# Patient Record
Sex: FEMALE | URBAN - METROPOLITAN AREA
[De-identification: names, ages, dates, MRNs, and addresses within clinical notes are randomized per-mention and may not be internally consistent; named-entity substitution may affect disease eponyms.]

---

## 2017-08-14 PROBLEM — I77.810 DILATED AORTIC ROOT (HCC): Status: ACTIVE | Noted: 2017-08-14

## 2018-03-21 ENCOUNTER — APPOINTMENT (RX ONLY)
Dept: URBAN - METROPOLITAN AREA CLINIC 349 | Facility: CLINIC | Age: 58
Setting detail: DERMATOLOGY
End: 2018-03-21

## 2018-03-21 DIAGNOSIS — L82.0 INFLAMED SEBORRHEIC KERATOSIS: ICD-10-CM

## 2018-03-21 DIAGNOSIS — D22 MELANOCYTIC NEVI: ICD-10-CM

## 2018-03-21 PROBLEM — K21.9 GASTRO-ESOPHAGEAL REFLUX DISEASE WITHOUT ESOPHAGITIS: Status: ACTIVE | Noted: 2018-03-21

## 2018-03-21 PROBLEM — J44.9 CHRONIC OBSTRUCTIVE PULMONARY DISEASE, UNSPECIFIED: Status: ACTIVE | Noted: 2018-03-21

## 2018-03-21 PROBLEM — E13.9 OTHER SPECIFIED DIABETES MELLITUS WITHOUT COMPLICATIONS: Status: ACTIVE | Noted: 2018-03-21

## 2018-03-21 PROBLEM — F41.9 ANXIETY DISORDER, UNSPECIFIED: Status: ACTIVE | Noted: 2018-03-21

## 2018-03-21 PROBLEM — D22.5 MELANOCYTIC NEVI OF TRUNK: Status: ACTIVE | Noted: 2018-03-21

## 2018-03-21 PROBLEM — L85.3 XEROSIS CUTIS: Status: ACTIVE | Noted: 2018-03-21

## 2018-03-21 PROBLEM — F32.9 MAJOR DEPRESSIVE DISORDER, SINGLE EPISODE, UNSPECIFIED: Status: ACTIVE | Noted: 2018-03-21

## 2018-03-21 PROCEDURE — 99202 OFFICE O/P NEW SF 15 MIN: CPT | Mod: 25

## 2018-03-21 PROCEDURE — 17110 DESTRUCTION B9 LES UP TO 14: CPT

## 2018-03-21 PROCEDURE — ? LIQUID NITROGEN

## 2018-03-21 PROCEDURE — ? COUNSELING

## 2018-03-21 PROCEDURE — ? PHOTO-DOCUMENTATION

## 2018-03-21 ASSESSMENT — LOCATION SIMPLE DESCRIPTION DERM
LOCATION SIMPLE: CHEST
LOCATION SIMPLE: RIGHT PRETIBIAL REGION

## 2018-03-21 ASSESSMENT — LOCATION ZONE DERM
LOCATION ZONE: LEG
LOCATION ZONE: TRUNK

## 2018-03-21 ASSESSMENT — LOCATION DETAILED DESCRIPTION DERM
LOCATION DETAILED: RIGHT DISTAL PRETIBIAL REGION
LOCATION DETAILED: RIGHT LATERAL SUPERIOR CHEST

## 2018-03-21 NOTE — PROCEDURE: LIQUID NITROGEN
Medical Necessity Information: It is in your best interest to select a reason for this procedure from the list below. All of these items fulfill various CMS LCD requirements except the new and changing color options.
Consent: The patient's consent was obtained including but not limited to risks of crusting, scabbing, blistering, scarring, darker or lighter pigmentary change, recurrence, incomplete removal and infection.
Include Z78.9 (Other Specified Conditions Influencing Health Status) As An Associated Diagnosis?: Yes
Detail Level: Detailed
Number Of Freeze-Thaw Cycles: 2 freeze-thaw cycles
Medical Necessity Clause: This procedure was medically necessary because the lesions that were treated were:
Post-Care Instructions: I reviewed with the patient in detail post-care instructions. Patient is to wear sunprotection, and avoid picking at any of the treated lesions. Pt may apply Vaseline to crusted or scabbing areas.
Add 52 Modifier (Optional): no

## 2018-03-21 NOTE — PROCEDURE: PHOTO-DOCUMENTATION
Detail Level: Zone
Photo Preface (Leave Blank If You Do Not Want): Photographs were obtained today of the right lower leg

## 2018-05-01 PROBLEM — Z79.01 LONG TERM (CURRENT) USE OF ANTICOAGULANTS: Status: ACTIVE | Noted: 2018-05-01

## 2019-02-22 PROBLEM — I71.20 THORACIC AORTIC ANEURYSM WITHOUT RUPTURE: Status: ACTIVE | Noted: 2019-02-22

## 2019-08-23 PROBLEM — E11.9 CONTROLLED TYPE 2 DIABETES MELLITUS WITHOUT COMPLICATION, WITHOUT LONG-TERM CURRENT USE OF INSULIN (HCC): Status: ACTIVE | Noted: 2019-08-23

## 2021-04-19 ENCOUNTER — HOSPITAL ENCOUNTER (OUTPATIENT)
Dept: CT IMAGING | Age: 61
Discharge: HOME | End: 2021-04-19
Attending: INTERNAL MEDICINE
Payer: MEDICAID

## 2021-04-19 DIAGNOSIS — I71.20 THORACIC AORTIC ANEURYSM WITHOUT RUPTURE: ICD-10-CM

## 2021-04-19 LAB — CREAT BLD-MCNC: 0.9 MG/DL (ref 0.8–1.5)

## 2021-04-19 PROCEDURE — 71275 CT ANGIOGRAPHY CHEST: CPT

## 2021-04-19 PROCEDURE — 74011000636 HC RX REV CODE- 636: Performed by: INTERNAL MEDICINE

## 2021-04-19 PROCEDURE — 74011000258 HC RX REV CODE- 258: Performed by: INTERNAL MEDICINE

## 2021-04-19 PROCEDURE — 82565 ASSAY OF CREATININE: CPT

## 2021-04-19 RX ORDER — SODIUM CHLORIDE 0.9 % (FLUSH) 0.9 %
10 SYRINGE (ML) INJECTION
Status: COMPLETED | OUTPATIENT
Start: 2021-04-19 | End: 2021-04-19

## 2021-04-19 RX ADMIN — Medication 10 ML: at 10:07

## 2021-04-19 RX ADMIN — IOPAMIDOL 100 ML: 755 INJECTION, SOLUTION INTRAVENOUS at 10:07

## 2021-04-19 RX ADMIN — SODIUM CHLORIDE 100 ML: 900 INJECTION, SOLUTION INTRAVENOUS at 10:07

## 2022-03-18 PROBLEM — E11.9 CONTROLLED TYPE 2 DIABETES MELLITUS WITHOUT COMPLICATION, WITHOUT LONG-TERM CURRENT USE OF INSULIN (HCC): Status: ACTIVE | Noted: 2019-08-23

## 2022-03-18 PROBLEM — I77.810 DILATED AORTIC ROOT (HCC): Status: ACTIVE | Noted: 2017-08-14

## 2022-03-19 PROBLEM — Z79.01 LONG TERM (CURRENT) USE OF ANTICOAGULANTS: Status: ACTIVE | Noted: 2018-05-01

## 2022-03-20 PROBLEM — I71.20 THORACIC AORTIC ANEURYSM WITHOUT RUPTURE: Status: ACTIVE | Noted: 2019-02-22

## 2022-06-01 ENCOUNTER — ANTI-COAG VISIT (OUTPATIENT)
Dept: CARDIOLOGY CLINIC | Age: 62
End: 2022-06-01
Payer: MEDICAID

## 2022-06-01 DIAGNOSIS — Z95.2 AORTIC VALVE REPLACED: ICD-10-CM

## 2022-06-01 DIAGNOSIS — Z79.01 LONG TERM (CURRENT) USE OF ANTICOAGULANTS: Primary | ICD-10-CM

## 2022-06-01 LAB
POC INR: 1.6
PROTHROMBIN TIME, POC: NORMAL
VALID INTERNAL CONTROL, POC: YES

## 2022-06-01 PROCEDURE — 85610 PROTHROMBIN TIME: CPT | Performed by: INTERNAL MEDICINE

## 2022-06-01 PROCEDURE — 93793 ANTICOAG MGMT PT WARFARIN: CPT | Performed by: INTERNAL MEDICINE

## 2022-06-01 NOTE — PROGRESS NOTES
Anticoagulation Summary  As of 2022    INR goal:  1.5-2.5   TTR:  --   INR used for dosin.6 (2022)   Warfarin maintenance plan:  2 mg (4 mg x 0.5) every Mon; 4 mg (4 mg x 1) all other days   Weekly warfarin total:  26 mg   Plan last modified:  Myra Tuttle MA (2022)   Next INR check:     Target end date:   Indefinite    Indications    Long term (current) use of anticoagulants [Z79.01]  Aortic valve replaced [Z95.2]             Anticoagulation Episode Summary     INR check location:  Anticoagulation Clinic    Preferred lab:      Send INR reminders to:  [facility]ARDI[facility]    Comments:  **PT RANGE IS 1.8-2.5**

## 2022-06-01 NOTE — PATIENT INSTRUCTIONS
Reminder: Please contact the Coumadin Clinic at [phone]  when you have medication changes. Examples, new medications, antibiotics, discontinued medications, new supplements, missed doses of warfarin or if you took extra doses of warfarin. This also includes OTC medications. Notifying us helps reduce the possibility of high and low INR's. In addition, if warfarin needs to be held for any procedures, please have surgeon or physician's office contact us before holding anticoagulant. Thanks, [address] Card[facility] Coumadin [facility].

## 2022-06-15 ENCOUNTER — ANTI-COAG VISIT (OUTPATIENT)
Dept: CARDIOLOGY CLINIC | Age: 62
End: 2022-06-15
Payer: MEDICAID

## 2022-06-15 DIAGNOSIS — Z79.01 LONG TERM (CURRENT) USE OF ANTICOAGULANTS: Primary | ICD-10-CM

## 2022-06-15 DIAGNOSIS — Z95.2 AORTIC VALVE REPLACED: ICD-10-CM

## 2022-06-15 LAB
POC INR: 1.8
PROTHROMBIN TIME, POC: NORMAL
VALID INTERNAL CONTROL, POC: YES

## 2022-06-15 PROCEDURE — 85610 PROTHROMBIN TIME: CPT | Performed by: INTERNAL MEDICINE

## 2022-06-15 PROCEDURE — 93793 ANTICOAG MGMT PT WARFARIN: CPT | Performed by: INTERNAL MEDICINE

## 2022-06-15 NOTE — PROGRESS NOTES
Anticoagulation Summary  As of 6/15/2022    INR goal:  1.5-2.5   TTR:  100.0 % (4 d)   INR used for dosin.8 (6/15/2022)   Warfarin maintenance plan:  2 mg (4 mg x 0.5) every Mon; 4 mg (4 mg x 1) all other days   Weekly warfarin total:  26 mg   Plan last modified:  Ariadna Garner MA (2022)   Next INR check:  2022   Target end date:   Indefinite    Indications    Long term (current) use of anticoagulants [Z79.01]  Aortic valve replaced [Z95.2]             Anticoagulation Episode Summary     INR check location:  Anticoagulation Clinic    Preferred lab:      Send INR reminders to:  [facility]    Comments:  **PT RANGE IS 1.8-2.5**

## 2022-07-13 ENCOUNTER — ANTI-COAG VISIT (OUTPATIENT)
Dept: CARDIOLOGY CLINIC | Age: 62
End: 2022-07-13
Payer: MEDICAID

## 2022-07-13 DIAGNOSIS — Z95.2 AORTIC VALVE REPLACED: ICD-10-CM

## 2022-07-13 DIAGNOSIS — Z79.01 LONG TERM (CURRENT) USE OF ANTICOAGULANTS: Primary | ICD-10-CM

## 2022-07-13 LAB
POC INR: 1.4
PROTHROMBIN TIME, POC: ABNORMAL
VALID INTERNAL CONTROL, POC: YES

## 2022-07-13 PROCEDURE — 85610 PROTHROMBIN TIME: CPT | Performed by: INTERNAL MEDICINE

## 2022-07-13 PROCEDURE — 93793 ANTICOAG MGMT PT WARFARIN: CPT | Performed by: INTERNAL MEDICINE

## 2022-07-13 NOTE — PROGRESS NOTES
Anticoagulation Summary  As of 2022    INR goal:  1.5-2.5   TTR:  78.5 % (1.1 mo)   INR used for dosin.4 (2022)   Warfarin maintenance plan:  2 mg (4 mg x 0.5) every Mon; 4 mg (4 mg x 1) all other days   Weekly warfarin total:  26 mg   Plan last modified:  Trina Norris MA (2022)   Next INR check:  2022   Target end date: Indefinite    Indications    Long term (current) use of anticoagulants [Z79.01]  Aortic valve replaced [Z95.2]             Anticoagulation Episode Summary     INR check location:  Anticoagulation Clinic    Preferred lab:      Send INR reminders to:  [facility]ARD[facility]    Comments:  **PT RANGE IS 1.8-2.5**        Patient unsure why INR is subtherapeutic. States that she has not missed any doses, nor has she eaten any Vitamin K. Instructed her to take a booster dose of 6mg tonight and take 4mg on Monday of next week. Recheck INR in ~2 weeks. Patient verbalized understanding & thanked.  //pg

## 2022-07-29 ENCOUNTER — ANTI-COAG VISIT (OUTPATIENT)
Dept: CARDIOLOGY CLINIC | Age: 62
End: 2022-07-29
Payer: MEDICAID

## 2022-07-29 DIAGNOSIS — Z95.2 AORTIC VALVE REPLACED: ICD-10-CM

## 2022-07-29 DIAGNOSIS — Z79.01 LONG TERM (CURRENT) USE OF ANTICOAGULANTS: Primary | ICD-10-CM

## 2022-07-29 LAB
POC INR: 2
PROTHROMBIN TIME, POC: YES

## 2022-07-29 PROCEDURE — 85610 PROTHROMBIN TIME: CPT | Performed by: INTERNAL MEDICINE

## 2022-07-29 PROCEDURE — 93793 ANTICOAG MGMT PT WARFARIN: CPT | Performed by: INTERNAL MEDICINE

## 2022-07-29 NOTE — PATIENT INSTRUCTIONS
Reminder: Please contact the Coumadin Clinic at [phone]  when you have medication changes. Examples, new medications, antibiotics, discontinued medications, new supplements, missed doses of warfarin or if you took extra doses of warfarin. This also includes OTC medications. Notifying us helps reduce the possibility of high and low INR's. In addition, if warfarin needs to be held for any procedures, please have surgeon or physician's office contact us before holding anticoagulant. Thanks, [address] Cardiology Coumadin Clinic.

## 2022-07-29 NOTE — PROGRESS NOTES
Anticoagulation Summary  As of 7/29/2022      INR goal:  1.5-2.5   TTR:  80.1 % (1.6 mo)   INR used for dosing:     Warfarin maintenance plan:  2 mg (4 mg x 0.5) every Mon; 4 mg (4 mg x 1) all other days   Weekly warfarin total:  26 mg   Plan last modified:  Rocio Hua MA (6/1/2022)   Next INR check:  8/12/2022   Target end date:   Indefinite    Indications    Long term (current) use of anticoagulants [Z79.01]  Aortic valve replaced [Z95.2]                 Anticoagulation Episode Summary       INR check location:  Anticoagulation Clinic    Preferred lab:      Send INR reminders to:  [facility]ARD[facility]    Comments:  **PT RANGE IS 1.8-2.5**

## 2022-08-12 ENCOUNTER — ANTI-COAG VISIT (OUTPATIENT)
Dept: CARDIOLOGY CLINIC | Age: 62
End: 2022-08-12
Payer: MEDICAID

## 2022-08-12 DIAGNOSIS — Z79.01 LONG TERM (CURRENT) USE OF ANTICOAGULANTS: Primary | ICD-10-CM

## 2022-08-12 DIAGNOSIS — Z95.2 AORTIC VALVE REPLACED: ICD-10-CM

## 2022-08-12 LAB
POC INR: 2.2
PROTHROMBIN TIME, POC: YES

## 2022-08-12 PROCEDURE — 93793 ANTICOAG MGMT PT WARFARIN: CPT | Performed by: INTERNAL MEDICINE

## 2022-08-12 PROCEDURE — 85610 PROTHROMBIN TIME: CPT | Performed by: INTERNAL MEDICINE

## 2022-08-12 NOTE — PATIENT INSTRUCTIONS
Reminder: Please contact the Coumadin Clinic at [phone]  when you have medication changes. Examples, new medications, antibiotics, discontinued medications, new supplements, missed doses of warfarin or if you took extra doses of warfarin. This also includes OTC medications. Notifying us helps reduce the possibility of high and low INR's. In addition, if warfarin needs to be held for any procedures, please have surgeon or physician's office contact us before holding anticoagulant. Thanks, [address] Cardiology Coumadin Clinic.

## 2022-08-12 NOTE — PROGRESS NOTES
Anticoagulation Summary  As of 2022      INR goal:  1.5-2.5   TTR:  84.5 % (2.1 mo)   INR used for dosin.2 (2022)   Warfarin maintenance plan:  2 mg (4 mg x 0.5) every Mon; 4 mg (4 mg x 1) all other days   Weekly warfarin total:  26 mg   Plan last modified:  Sy Keating MA (2022)   Next INR check:  2022   Target end date:   Indefinite    Indications    Long term (current) use of anticoagulants [Z79.01]  Aortic valve replaced [Z95.2]                 Anticoagulation Episode Summary       INR check location:  Anticoagulation Clinic    Preferred lab:      Send INR reminders to:  [facility]    Comments:  **PT RANGE IS 1.8-2.5**

## 2022-09-09 ENCOUNTER — ANTI-COAG VISIT (OUTPATIENT)
Dept: CARDIOLOGY CLINIC | Age: 62
End: 2022-09-09
Payer: MEDICAID

## 2022-09-09 DIAGNOSIS — Z95.2 AORTIC VALVE REPLACED: ICD-10-CM

## 2022-09-09 DIAGNOSIS — Z79.01 LONG TERM (CURRENT) USE OF ANTICOAGULANTS: Primary | ICD-10-CM

## 2022-09-09 LAB
POC INR: 1.8
PROTHROMBIN TIME, POC: YES

## 2022-09-09 PROCEDURE — 93793 ANTICOAG MGMT PT WARFARIN: CPT | Performed by: INTERNAL MEDICINE

## 2022-09-09 PROCEDURE — 85610 PROTHROMBIN TIME: CPT | Performed by: INTERNAL MEDICINE

## 2022-09-09 NOTE — PROGRESS NOTES
Anticoagulation Summary  As of 2022      INR goal:  1.5-2.5   TTR:  89.3 % (3 mo)   INR used for dosin.8 (2022)   Warfarin maintenance plan:  2 mg (4 mg x 0.5) every Mon; 4 mg (4 mg x 1) all other days   Weekly warfarin total:  26 mg   Plan last modified:  Bolivar Waters MA (2022)   Next INR check:  10/7/2022   Target end date:   Indefinite    Indications    Long term (current) use of anticoagulants [Z79.01]  Aortic valve replaced [Z95.2]                 Anticoagulation Episode Summary       INR check location:  Anticoagulation Clinic    Preferred lab:      Send INR reminders to:  [facility]    Comments:  **PT RANGE IS 1.8-2.5**

## 2022-09-09 NOTE — PATIENT INSTRUCTIONS
Reminder: Please contact the Coumadin Clinic at [phone]  when you have medication changes. Examples, new medications, antibiotics, discontinued medications, new supplements, missed doses of warfarin or if you took extra doses of warfarin. This also includes OTC medications. Notifying us helps reduce the possibility of high and low INR's. In addition, if warfarin needs to be held for any procedures, please have surgeon or physician's office contact us before holding anticoagulant. Thanks, [facility]in [facility].

## 2022-10-07 ENCOUNTER — ANTI-COAG VISIT (OUTPATIENT)
Dept: CARDIOLOGY CLINIC | Age: 62
End: 2022-10-07
Payer: MEDICAID

## 2022-10-07 DIAGNOSIS — Z95.2 AORTIC VALVE REPLACED: ICD-10-CM

## 2022-10-07 DIAGNOSIS — Z79.01 LONG TERM (CURRENT) USE OF ANTICOAGULANTS: Primary | ICD-10-CM

## 2022-10-07 LAB
POC INR: 1.4
PROTHROMBIN TIME, POC: YES

## 2022-10-07 PROCEDURE — 93793 ANTICOAG MGMT PT WARFARIN: CPT | Performed by: INTERNAL MEDICINE

## 2022-10-07 PROCEDURE — 85610 PROTHROMBIN TIME: CPT | Performed by: INTERNAL MEDICINE

## 2022-10-07 NOTE — PROGRESS NOTES
Anticoagulation Summary  As of 10/7/2022      INR goal:  1.5-2.5   TTR:  86.0 % (3.9 mo)   INR used for dosing:     Warfarin maintenance plan:  4 mg (4 mg x 1) every day   Weekly warfarin total:  28 mg   Plan last modified:  Rogelio Parks MA (10/7/2022)   Next INR check:  10/21/2022   Target end date:   Indefinite    Indications    Long term (current) use of anticoagulants [Z79.01]  Aortic valve replaced [Z95.2]                 Anticoagulation Episode Summary       INR check location:  Anticoagulation Clinic    Preferred lab:      Send INR reminders to:  [facility]ARDIOLOG[facility]    Comments:  **PT RANGE IS 1.8-2.5**

## 2022-10-07 NOTE — PATIENT INSTRUCTIONS
Reminder: Please contact the Coumadin Clinic at [phone]  when you have medication changes. Examples, new medications, antibiotics, discontinued medications, new supplements, missed doses of warfarin or if you took extra doses of warfarin. This also includes OTC medications. Notifying us helps reduce the possibility of high and low INR's. In addition, if warfarin needs to be held for any procedures, please have surgeon or physician's office contact us before holding anticoagulant. Thanks, [facility]iology Coumadin Clinic.

## 2022-10-21 ENCOUNTER — ANTI-COAG VISIT (OUTPATIENT)
Dept: CARDIOLOGY CLINIC | Age: 62
End: 2022-10-21
Payer: MEDICAID

## 2022-10-21 DIAGNOSIS — Z79.01 LONG TERM (CURRENT) USE OF ANTICOAGULANTS: Primary | ICD-10-CM

## 2022-10-21 DIAGNOSIS — Z95.2 AORTIC VALVE REPLACED: ICD-10-CM

## 2022-10-21 LAB
POC INR: 1.6
PROTHROMBIN TIME, POC: YES

## 2022-10-21 PROCEDURE — 85610 PROTHROMBIN TIME: CPT | Performed by: INTERNAL MEDICINE

## 2022-10-21 PROCEDURE — 93793 ANTICOAG MGMT PT WARFARIN: CPT | Performed by: INTERNAL MEDICINE

## 2022-10-21 NOTE — PATIENT INSTRUCTIONS
Reminder: Please contact the Coumadin Clinic at [phone]  when you have medication changes. Examples, new medications, antibiotics, discontinued medications, new supplements, missed doses of warfarin or if you took extra doses of warfarin. This also includes OTC medications. Notifying us helps reduce the possibility of high and low INR's. In addition, if warfarin needs to be held for any procedures, please have surgeon or physician's office contact us before holding anticoagulant. Thanks, [facility] Cardiology Coumadin Clinic.

## 2022-10-21 NOTE — PROGRESS NOTES
Pt was below range at 1.6. increased weekly dose. Will recheck in 2 weeks. Anticoagulation Summary  As of 10/21/2022      INR goal:  1.5-2.5   TTR:  82.2 % (4.4 mo)   INR used for dosin.6 (10/21/2022)   Warfarin maintenance plan:  6 mg (4 mg x 1.5) every Fri; 4 mg (4 mg x 1) all other days; Starting 10/21/2022   Weekly warfarin total:  30 mg   Plan last modified:  Emmy Oliveros MA (10/21/2022)   Next INR check:  2022   Target end date:   Indefinite    Indications    Long term (current) use of anticoagulants [Z79.01]  Aortic valve replaced [Z95.2]                 Anticoagulation Episode Summary       INR check location:  Anticoagulation Clinic    Preferred lab:      Send INR reminders to:  \[facility]\"" CARDIOLOGY [facility]    Comments:  **PT RANGE IS 1.8-2.5**

## 2022-11-04 ENCOUNTER — ANTI-COAG VISIT (OUTPATIENT)
Dept: CARDIOLOGY CLINIC | Age: 62
End: 2022-11-04
Payer: MEDICAID

## 2022-11-04 DIAGNOSIS — Z79.01 LONG TERM (CURRENT) USE OF ANTICOAGULANTS: Primary | ICD-10-CM

## 2022-11-04 DIAGNOSIS — Z95.2 AORTIC VALVE REPLACED: ICD-10-CM

## 2022-11-04 LAB
POC INR: 1.7
PROTHROMBIN TIME, POC: YES

## 2022-11-04 PROCEDURE — 93793 ANTICOAG MGMT PT WARFARIN: CPT | Performed by: INTERNAL MEDICINE

## 2022-11-04 PROCEDURE — 85610 PROTHROMBIN TIME: CPT | Performed by: INTERNAL MEDICINE

## 2022-11-04 NOTE — PROGRESS NOTES
Anticoagulation Summary  As of 2022      INR goal:  1.5-2.5   TTR:  83.8 % (4.9 mo)   INR used for dosin.7 (2022)   Warfarin maintenance plan:  6 mg (4 mg x 1.5) every Mon, Fri; 4 mg (4 mg x 1) all other days; Starting 2022   Weekly warfarin total:  32 mg   Plan last modified:  John Sandoval MA (2022)   Next INR check:  11/10/2022   Target end date: Indefinite    Indications    Long term (current) use of anticoagulants [Z79.01]  Aortic valve replaced [Z95.2]                 Anticoagulation Episode Summary       INR check location:  Anticoagulation Clinic    Preferred lab:      Send INR reminders to:  [facility] CARDIOLOG[facility]    Comments:  **PT RANGE IS 1.8-2.5**          Patient's INR is 1.7, which is only slightly below her target range. Instructed her to increase Monday's dose to 6mg as well as her normal scheduled 6mg dose tonight. Patient is worries about her INR being low, stating that she doesn't want to have another stroke. Encouraged to follow increased dose listed above, do not consume any Vitamin K, and to have INR checked next week when she is in to see Dr. Leslee Javed.  //pg

## 2022-11-10 ENCOUNTER — ANTI-COAG VISIT (OUTPATIENT)
Dept: CARDIOLOGY CLINIC | Age: 62
End: 2022-11-10
Payer: MEDICAID

## 2022-11-10 ENCOUNTER — OFFICE VISIT (OUTPATIENT)
Dept: CARDIOLOGY CLINIC | Age: 62
End: 2022-11-10
Payer: MEDICAID

## 2022-11-10 VITALS
SYSTOLIC BLOOD PRESSURE: 116 MMHG | HEART RATE: 72 BPM | WEIGHT: 126 LBS | DIASTOLIC BLOOD PRESSURE: 70 MMHG | BODY MASS INDEX: 23.81 KG/M2

## 2022-11-10 DIAGNOSIS — Z95.2 AORTIC VALVE REPLACED: ICD-10-CM

## 2022-11-10 DIAGNOSIS — I10 ESSENTIAL HYPERTENSION: ICD-10-CM

## 2022-11-10 DIAGNOSIS — Z79.01 LONG TERM (CURRENT) USE OF ANTICOAGULANTS: Primary | ICD-10-CM

## 2022-11-10 DIAGNOSIS — I71.21 ANEURYSM OF ASCENDING AORTA WITHOUT RUPTURE: Primary | ICD-10-CM

## 2022-11-10 LAB
POC INR: 1.9
PROTHROMBIN TIME, POC: YES

## 2022-11-10 PROCEDURE — 3074F SYST BP LT 130 MM HG: CPT | Performed by: INTERNAL MEDICINE

## 2022-11-10 PROCEDURE — 99214 OFFICE O/P EST MOD 30 MIN: CPT | Performed by: INTERNAL MEDICINE

## 2022-11-10 PROCEDURE — 3078F DIAST BP <80 MM HG: CPT | Performed by: INTERNAL MEDICINE

## 2022-11-10 PROCEDURE — 85610 PROTHROMBIN TIME: CPT | Performed by: INTERNAL MEDICINE

## 2022-11-10 RX ORDER — TRAZODONE HYDROCHLORIDE 150 MG/1
150 TABLET ORAL
COMMUNITY
Start: 2022-09-21

## 2022-11-10 ASSESSMENT — ENCOUNTER SYMPTOMS
BLURRED VISION: 0
HOARSE VOICE: 0
COLOR CHANGE: 0
BOWEL INCONTINENCE: 0
DIARRHEA: 0
SPUTUM PRODUCTION: 0
ORTHOPNEA: 0
HEMATOCHEZIA: 0
ABDOMINAL PAIN: 0
HEMATEMESIS: 0
SHORTNESS OF BREATH: 0
WHEEZING: 0

## 2022-11-10 NOTE — PROGRESS NOTES
Anticoagulation Summary  As of 11/10/2022      INR goal:  1.5-2.5   TTR:  84.5 % (5.1 mo)   INR used for dosing:     Warfarin maintenance plan:  6 mg (4 mg x 1.5) every Mon, Fri; 4 mg (4 mg x 1) all other days; Starting 11/10/2022   Weekly warfarin total:  32 mg   Plan last modified:  Delvis Osorio MA (11/4/2022)   Next INR check:  11/22/2022   Target end date:   Indefinite    Indications    Long term (current) use of anticoagulants [Z79.01]  Aortic valve replaced [Z95.2]                 Anticoagulation Episode Summary       INR check location:  Anticoagulation Clinic    Preferred lab:      Send INR reminders to:  [facility]    Comments:  **PT RANGE IS 1.8-2.5**

## 2022-11-10 NOTE — PROGRESS NOTES
[address] [address] A[place]  [address]  PHONE: [phone]        11/10/22        NAME:  Blake Kumar  : 1960  MRN: [id number]       SUBJECTIVE:   Blake Kumar is a 57 y.o. female seen for a follow up visit regarding the following: The patient has a hx of AVR,TAA,HFpEF,T2DM,and COPD. Echo in 2022 reported stable TAA and normal fx AVR with low normal LV EF. She returns for scheduled follow up. Chief Complaint   Patient presents with    Hypertension     6 month follow up       HPI:    Hypertension  The problem is unchanged. The problem is controlled. Pertinent negatives include no anxiety, blurred vision, chest pain, headaches, malaise/fatigue, neck pain, orthopnea, palpitations, peripheral edema, PND, shortness of breath or sweats. Past Medical History, Past Surgical History, Family history, Social History, and Medications were all reviewed with the patient today and updated as necessary. Current Outpatient Medications:     traZODone (DESYREL) 150 MG tablet, Take 150 mg by mouth, Disp: , Rfl:     albuterol sulfate  (90 Base) MCG/ACT inhaler, Inhale into the lungs, Disp: , Rfl:     aspirin 81 MG EC tablet, Take by mouth daily, Disp: , Rfl:     budesonide-formoterol (SYMBICORT) 160-4.5 MCG/ACT AERO, Inhale 2 puffs into the lungs 2 times daily, Disp: , Rfl:     clonazePAM (KLONOPIN) 1 MG tablet, 0.5 mg 3 times daily. , Disp: , Rfl:     Ertugliflozin L-PyroglutamicAc 15 MG TABS, Take 15 mg by mouth daily, Disp: , Rfl:     esomeprazole (NEXIUM) 40 MG delayed release capsule, Take by mouth 2 times daily, Disp: , Rfl:     metFORMIN (GLUCOPHAGE) 500 MG tablet, Take 500 mg by mouth 3 times daily (with meals), Disp: , Rfl:     montelukast (SINGULAIR) 10 MG tablet, Take 10 mg by mouth daily, Disp: , Rfl:     simvastatin (ZOCOR) 40 MG tablet, Take by mouth, Disp: , Rfl:     warfarin (COUMADIN) 4 MG tablet, Take 1 tab everyday or as directed, Disp: , Rfl:   Allergies   Allergen Reactions    Cefaclor Other (See Comments)     UNKNOWN REACTION     Clindamycin Other (See Comments)    Erythromycin Other (See Comments)     UNKNOWN REACTION     Levofloxacin Other (See Comments)    Moxifloxacin Other (See Comments)     UNKNOWN REACTION     Ofloxacin Other (See Comments)     UNKNOWN REACTION     Sulfa Antibiotics Other (See Comments)     UNKNOWN REACTION     Sulfamethoxazole-Trimethoprim Other (See Comments)     Past Medical History:   Diagnosis Date    Abnormal EKG 3/25/2016    Aortic valve stenosis, congenital 3/25/2016    Chest pain 3/25/2016    COPD (chronic obstructive pulmonary disease) ([facility] [address] 3/25/2016    Essential hypertension 3/25/2016    Hyperlipidemia 7/54/3128    Systolic congestive heart failure ([facility] [address] 3/25/2016     Past Surgical History:   Procedure Laterality Date    CARDIAC VALVE SURGERY       History reviewed. No pertinent family history. Social History     Tobacco Use    Smoking status: Former    Smokeless tobacco: Never   Substance Use Topics    Alcohol use: No       ROS:    Review of Systems   Constitutional: Negative for chills, decreased appetite, diaphoresis, fever and malaise/fatigue. HENT:  Negative for congestion, hearing loss, hoarse voice and nosebleeds. Eyes:  Negative for blurred vision. Cardiovascular:  Negative for chest pain, claudication, cyanosis, dyspnea on exertion, irregular heartbeat, leg swelling, near-syncope, orthopnea, palpitations, paroxysmal nocturnal dyspnea and syncope. Respiratory:  Negative for shortness of breath, sputum production and wheezing. Endocrine: Negative for polydipsia, polyphagia and polyuria. Skin:  Negative for color change. Musculoskeletal:  Negative for neck pain. Gastrointestinal:  Negative for abdominal pain, bowel incontinence, diarrhea, hematemesis and hematochezia. Genitourinary:  Negative for dysuria, frequency and hematuria.    Neurological:  Negative for focal weakness, headaches, light-headedness, loss of balance, numbness, sensory change and weakness. Psychiatric/Behavioral:  Negative for altered mental status and memory loss. PHYSICAL EXAM:   /70   Pulse 72   Wt 126 lb (57.2 kg)   BMI 23.81 kg/m²      Physical Exam  Constitutional:       Appearance: Normal appearance. HENT:      Head: Normocephalic and atraumatic. Nose: Nose normal.   Eyes:      Extraocular Movements: Extraocular movements intact. Pupils: Pupils are equal, round, and reactive to light. Neck:      Vascular: No carotid bruit. Cardiovascular:      Rate and Rhythm: Regular rhythm. Pulses: Normal pulses. Heart sounds: Murmur (Sharp mechanical aortic valve sound) heard. Pulmonary:      Effort: Pulmonary effort is normal.      Breath sounds: Normal breath sounds. Abdominal:      General: Abdomen is flat. Bowel sounds are normal.      Palpations: Abdomen is soft. Musculoskeletal:         General: Normal range of motion. Cervical back: Normal range of motion and neck supple. Skin:     General: Skin is warm and dry. Neurological:      General: No focal deficit present. Mental Status: She is alert and oriented to person, place, and time. Psychiatric:         Mood and Affect: Mood normal.       Medical problems and test results were reviewed with the patient today.      Recent Results (from the past 672 hour(s))   PT/INR (Resulted at D/in-office AND billed by [facility])    Collection Time: 10/21/22  1:02 PM   Result Value Ref Range    Prothrombin time, POC yes     POC INR 1.6    PT/INR (Resulted at [facility]/in-office AND billed by [facility])    Collection Time: 11/04/22 12:53 PM   Result Value Ref Range    Prothrombin time, POC yes     POC INR 1.7    PT/INR (Resulted at [facility]/in-office AND billed by [facility])    Collection Time: 11/10/22  1:29 PM   Result Value Ref Range    Prothrombin time, POC yes     POC INR 1.9      No results found for: CHOL, CHOLPOCT, CHOLX, CHLST, CHOLV, HDL, HDLPOC, HDLC, LDL, LDLC, VLDLC, VLDL, TGLX, TRIGL  Results for orders placed or performed in visit on 11/10/22   PT/INR (Resulted at [facility]/in-office AND billed by [facility])   Result Value Ref Range    Prothrombin time, POC yes     POC INR 1.9        ASSESSMENT and PLAN    Loc PORTILLO was seen today for hypertension. Diagnoses and all orders for this visit:    Aneurysm of ascending aorta without rupture: Follow up echo in 6 months. -     Transthoracic echocardiogram (TTE) complete with contrast, bubble, strain, and 3D PRN; Future    Essential hypertension:resolved. Aortic valve replaced:Stable. AVR performed in 1994. Follow up annual echo in 6 months. Continue VKA and ASA  -     Transthoracic echocardiogram (TTE) complete with contrast, bubble, strain, and 3D PRN; Future        Disposition:    Return in about 6 months (around 5/10/2023) for Follow up after Echo.                 Mindy Wong MD  11/10/2022  1:46 PM

## 2022-11-10 NOTE — PATIENT INSTRUCTIONS
Reminder: Please contact the Coumadin Clinic at [phone]  when you have medication changes. Examples, new medications, antibiotics, discontinued medications, new supplements, missed doses of warfarin or if you took extra doses of warfarin. This also includes OTC medications. Notifying us helps reduce the possibility of high and low INR's. In addition, if warfarin needs to be held for any procedures, please have surgeon or physician's office contact us before holding anticoagulant. Thanks, [facility] Cardiology Coumadin Clinic.

## 2022-11-23 ENCOUNTER — ANTI-COAG VISIT (OUTPATIENT)
Dept: CARDIOLOGY CLINIC | Age: 62
End: 2022-11-23
Payer: MEDICAID

## 2022-11-23 DIAGNOSIS — Z79.01 LONG TERM (CURRENT) USE OF ANTICOAGULANTS: Primary | ICD-10-CM

## 2022-11-23 DIAGNOSIS — Z95.2 AORTIC VALVE REPLACED: ICD-10-CM

## 2022-11-23 LAB
POC INR: 2.7
PROTHROMBIN TIME, POC: YES

## 2022-11-23 PROCEDURE — 85610 PROTHROMBIN TIME: CPT | Performed by: INTERNAL MEDICINE

## 2022-11-23 PROCEDURE — 93793 ANTICOAG MGMT PT WARFARIN: CPT | Performed by: INTERNAL MEDICINE

## 2022-11-23 NOTE — PROGRESS NOTES
Will decrease weekly dose and recheck in 2 weeks//Km    Anticoagulation Summary  As of 11/23/2022      INR goal:  1.5-2.5   TTR:  83.8 % (5.5 mo)   INR used for dosing:     Warfarin maintenance plan:  6 mg (4 mg x 1.5) every Mon; 4 mg (4 mg x 1) all other days; Starting 11/23/2022   Weekly warfarin total:  30 mg   Plan last modified:  King Howe MA (11/23/2022)   Next INR check:  12/7/2022   Target end date:   Indefinite    Indications    Long term (current) use of anticoagulants [Z79.01]  Aortic valve replaced [Z95.2]                 Anticoagulation Episode Summary       INR check location:  Anticoagulation Clinic    Preferred lab:      Send INR reminders to:  [facility] CARDIOLOG[facility]    Comments:  **PT RANGE IS 1.8-2.5**

## 2022-11-23 NOTE — PATIENT INSTRUCTIONS
Reminder: Please contact the Coumadin Clinic at [phone]  when you have medication changes. Examples, new medications, antibiotics, discontinued medications, new supplements, missed doses of warfarin or if you took extra doses of warfarin. This also includes OTC medications. Notifying us helps reduce the possibility of high and low INR's. In addition, if warfarin needs to be held for any procedures, please have surgeon or physician's office contact us before holding anticoagulant. Thanks, [facility] Cardiology Coumadin Clinic.

## 2022-12-09 ENCOUNTER — ANTI-COAG VISIT (OUTPATIENT)
Dept: CARDIOLOGY CLINIC | Age: 62
End: 2022-12-09
Payer: MEDICAID

## 2022-12-09 DIAGNOSIS — Z95.2 AORTIC VALVE REPLACED: ICD-10-CM

## 2022-12-09 DIAGNOSIS — Z79.01 LONG TERM (CURRENT) USE OF ANTICOAGULANTS: Primary | ICD-10-CM

## 2022-12-09 LAB
POC INR: 3.2
PROTHROMBIN TIME, POC: YES

## 2022-12-09 PROCEDURE — 93793 ANTICOAG MGMT PT WARFARIN: CPT | Performed by: INTERNAL MEDICINE

## 2022-12-09 PROCEDURE — 85610 PROTHROMBIN TIME: CPT | Performed by: INTERNAL MEDICINE

## 2022-12-09 NOTE — PROGRESS NOTES
Pt is above range, will decrease weekly dose and recheck in 2 weeks//KM    Anticoagulation Summary  As of 12/9/2022      INR goal:  1.5-2.5   TTR:  76.5 % (6 mo)   INR used for dosing:  3.2 (12/9/2022)   Warfarin maintenance plan:  4 mg (4 mg x 1) every day; Starting 12/9/2022   Weekly warfarin total:  28 mg   Plan last modified:  Henrique Hinton MA (12/9/2022)   Next INR check:  12/23/2022   Target end date:   Indefinite    Indications    Long term (current) use of anticoagulants [Z79.01]  Aortic valve replaced [Z95.2]                 Anticoagulation Episode Summary       INR check location:  Anticoagulation Clinic    Preferred lab:      Send INR reminders to:  [facility] CARD[facility]OLOG[facility]    Comments:  **PT RANGE IS 1.8-2.5**

## 2022-12-09 NOTE — PATIENT INSTRUCTIONS
,Reminder: Please contact the Coumadin Clinic at [phone]  when you have medication changes. Examples, new medications, antibiotics, discontinued medications, new supplements, missed doses of warfarin or if you took extra doses of warfarin. This also includes OTC medications. Notifying us helps reduce the possibility of high and low INR's. In addition, if warfarin needs to be held for any procedures, please have surgeon or physician's office contact us before holding anticoagulant. Thanks, [facility] Cardiology Coumadin Clinic.

## 2022-12-23 ENCOUNTER — ANTI-COAG VISIT (OUTPATIENT)
Dept: CARDIOLOGY CLINIC | Age: 62
End: 2022-12-23

## 2022-12-23 DIAGNOSIS — Z95.2 AORTIC VALVE REPLACED: ICD-10-CM

## 2022-12-23 DIAGNOSIS — Z79.01 LONG TERM (CURRENT) USE OF ANTICOAGULANTS: Primary | ICD-10-CM

## 2022-12-23 LAB
POC INR: 2
PROTHROMBIN TIME, POC: YES

## 2022-12-23 NOTE — PATIENT INSTRUCTIONS
Reminder: Please contact the Coumadin Clinic at [phone]  when you have medication changes. Examples, new medications, antibiotics, discontinued medications, new supplements, missed doses of warfarin or if you took extra doses of warfarin. This also includes OTC medications. Notifying us helps reduce the possibility of high and low INR's. In addition, if warfarin needs to be held for any procedures, please have surgeon or physician's office contact us before holding anticoagulant. Thanks, [facility].

## 2022-12-23 NOTE — PROGRESS NOTES
Anticoagulation Summary  As of 2022      INR goal:  1.5-2.5   TTR:  73.9 % (6.5 mo)   INR used for dosin.0 (2022)   Warfarin maintenance plan:  4 mg (4 mg x 1) every day; Starting 2022   Weekly warfarin total:  28 mg   Plan last modified:  Jona Mcelroy MA (2022)   Next INR check:  2023   Target end date:   Indefinite    Indications    Long term (current) use of anticoagulants [Z79.01]  Aortic valve replaced [Z95.2]                 Anticoagulation Episode Summary       INR check location:  Anticoagulation Clinic    Preferred lab:      Send INR reminders to:  [facility]    Comments:  **PT RANGE IS 1.8-2.5**

## 2023-01-06 ENCOUNTER — ANTI-COAG VISIT (OUTPATIENT)
Dept: CARDIOLOGY CLINIC | Age: 63
End: 2023-01-06

## 2023-01-06 DIAGNOSIS — Z79.01 LONG TERM (CURRENT) USE OF ANTICOAGULANTS: Primary | ICD-10-CM

## 2023-01-06 DIAGNOSIS — Z95.2 AORTIC VALVE REPLACED: ICD-10-CM

## 2023-01-06 LAB
POC INR: 1.7
PROTHROMBIN TIME, POC: YES

## 2023-01-06 RX ORDER — WARFARIN SODIUM 5 MG/1
TABLET ORAL
Qty: 24 TABLET | Refills: 3 | Status: SHIPPED | OUTPATIENT
Start: 2023-01-06

## 2023-01-06 RX ORDER — WARFARIN SODIUM 4 MG/1
4 TABLET ORAL DAILY
Qty: 90 TABLET | Refills: 3 | Status: SHIPPED | OUTPATIENT
Start: 2023-01-06

## 2023-01-06 NOTE — PROGRESS NOTES
Anticoagulation Summary  As of 2023      INR goal:  1.5-2.5   TTR:  75.6 % (7 mo)   INR used for dosin.7 (2023)   Warfarin maintenance plan:  5 mg (5 mg x 1) every Fri; 4 mg (4 mg x 1) all other days; Starting 2023   Weekly warfarin total:  29 mg   Plan last modified:  Flor Deras MA (2023)   Next INR check:  2023   Target end date:   Indefinite    Indications    Long term (current) use of anticoagulants [Z79.01]  Aortic valve replaced [Z95.2]                 Anticoagulation Episode Summary       INR check location:  Anticoagulation Clinic    Preferred lab:      Send INR reminders to:  [facility]    Comments:  **PT RANGE IS 1.8-2.5**

## 2023-01-20 ENCOUNTER — ANTI-COAG VISIT (OUTPATIENT)
Dept: CARDIOLOGY CLINIC | Age: 63
End: 2023-01-20

## 2023-01-20 DIAGNOSIS — Z79.01 LONG TERM (CURRENT) USE OF ANTICOAGULANTS: Primary | ICD-10-CM

## 2023-01-20 DIAGNOSIS — Z95.2 AORTIC VALVE REPLACED: ICD-10-CM

## 2023-01-20 LAB
POC INR: 2.1
PROTHROMBIN TIME, POC: YES

## 2023-01-20 NOTE — PATIENT INSTRUCTIONS
Reminder: Please contact the Coumadin Clinic at [phone]  when you have medication changes. Examples, new medications, antibiotics, discontinued medications, new supplements, missed doses of warfarin or if you took extra doses of warfarin. This also includes OTC medications. Notifying us helps reduce the possibility of high and low INR's. In addition, if warfarin needs to be held for any procedures, please have surgeon or physician's office contact us before holding anticoagulant. Thanks, [facility] Coumadin [facility].

## 2023-01-20 NOTE — PROGRESS NOTES
Anticoagulation Summary  As of 2023      INR goal:  1.5-2.5   TTR:  77.1 % (7.4 mo)   INR used for dosin.1 (2023)   Warfarin maintenance plan:  5 mg (5 mg x 1) every Fri; 4 mg (4 mg x 1) all other days; Starting 2023   Weekly warfarin total:  29 mg   Plan last modified:  Galen Haider MA (2023)   Next INR check:  2023   Target end date:   Indefinite    Indications    Long term (current) use of anticoagulants [Z79.01]  Aortic valve replaced [Z95.2]                 Anticoagulation Episode Summary       INR check location:  Anticoagulation Clinic    Preferred lab:      Send INR reminders to:  [facility]    Comments:  **PT RANGE IS 1.8-2.5**

## 2023-02-03 ENCOUNTER — APPOINTMENT (OUTPATIENT)
Dept: GENERAL RADIOLOGY | Age: 63
End: 2023-02-03
Payer: MEDICAID

## 2023-02-03 ENCOUNTER — HOSPITAL ENCOUNTER (EMERGENCY)
Age: 63
Discharge: HOME | End: 2023-02-03
Attending: EMERGENCY MEDICINE
Payer: MEDICAID

## 2023-02-03 VITALS
WEIGHT: 125 LBS | DIASTOLIC BLOOD PRESSURE: 61 MMHG | HEART RATE: 82 BPM | RESPIRATION RATE: 19 BRPM | OXYGEN SATURATION: 94 % | TEMPERATURE: 97.9 F | HEIGHT: 60 IN | SYSTOLIC BLOOD PRESSURE: 112 MMHG | BODY MASS INDEX: 24.54 KG/M2

## 2023-02-03 DIAGNOSIS — R07.9 ACUTE CHEST PAIN: ICD-10-CM

## 2023-02-03 DIAGNOSIS — J44.1 COPD WITH ACUTE EXACERBATION (HCC): Primary | ICD-10-CM

## 2023-02-03 LAB
ALBUMIN SERPL-MCNC: 3.9 G/DL (ref 3.2–4.6)
ALBUMIN/GLOB SERPL: 1.2 (ref 0.4–1.6)
ALP SERPL-CCNC: 67 U/L (ref 50–136)
ALT SERPL-CCNC: 30 U/L (ref 12–65)
ANION GAP SERPL CALC-SCNC: 11 MMOL/L (ref 2–11)
AST SERPL-CCNC: 15 U/L (ref 15–37)
BASOPHILS # BLD: 0.1 K/UL (ref 0–0.2)
BASOPHILS NFR BLD: 1 % (ref 0–2)
BILIRUB SERPL-MCNC: 0.2 MG/DL (ref 0.2–1.1)
BUN SERPL-MCNC: 25 MG/DL (ref 8–23)
CALCIUM SERPL-MCNC: 9.2 MG/DL (ref 8.3–10.4)
CHLORIDE SERPL-SCNC: 104 MMOL/L (ref 101–110)
CO2 SERPL-SCNC: 25 MMOL/L (ref 21–32)
CREAT SERPL-MCNC: 0.9 MG/DL (ref 0.6–1)
DIFFERENTIAL METHOD BLD: ABNORMAL
EOSINOPHIL # BLD: 0.3 K/UL (ref 0–0.8)
EOSINOPHIL NFR BLD: 4 % (ref 0.5–7.8)
ERYTHROCYTE [DISTWIDTH] IN BLOOD BY AUTOMATED COUNT: 14.5 % (ref 11.9–14.6)
GLOBULIN SER CALC-MCNC: 3.3 G/DL (ref 2.8–4.5)
GLUCOSE SERPL-MCNC: 116 MG/DL (ref 65–100)
HCT VFR BLD AUTO: 42.1 % (ref 35.8–46.3)
HGB BLD-MCNC: 13.3 G/DL (ref 11.7–15.4)
IMM GRANULOCYTES # BLD AUTO: 0 K/UL (ref 0–0.5)
IMM GRANULOCYTES NFR BLD AUTO: 0 % (ref 0–5)
INR PPP: 1.7
LYMPHOCYTES # BLD: 3.2 K/UL (ref 0.5–4.6)
LYMPHOCYTES NFR BLD: 42 % (ref 13–44)
MAGNESIUM SERPL-MCNC: 2 MG/DL (ref 1.8–2.4)
MCH RBC QN AUTO: 29.8 PG (ref 26.1–32.9)
MCHC RBC AUTO-ENTMCNC: 31.6 G/DL (ref 31.4–35)
MCV RBC AUTO: 94.4 FL (ref 82–102)
MONOCYTES # BLD: 0.5 K/UL (ref 0.1–1.3)
MONOCYTES NFR BLD: 6 % (ref 4–12)
NEUTS SEG # BLD: 3.6 K/UL (ref 1.7–8.2)
NEUTS SEG NFR BLD: 47 % (ref 43–78)
NRBC # BLD: 0 K/UL (ref 0–0.2)
NT PRO BNP: 523 PG/ML (ref 5–125)
PLATELET # BLD AUTO: 237 K/UL (ref 150–450)
PMV BLD AUTO: 9.3 FL (ref 9.4–12.3)
POTASSIUM SERPL-SCNC: 4 MMOL/L (ref 3.5–5.1)
PROT SERPL-MCNC: 7.2 G/DL (ref 6.3–8.2)
PROTHROMBIN TIME: 20.1 SEC (ref 12.6–14.3)
RBC # BLD AUTO: 4.46 M/UL (ref 4.05–5.2)
SODIUM SERPL-SCNC: 140 MMOL/L (ref 133–143)
TROPONIN I SERPL HS-MCNC: 6.9 PG/ML (ref 0–14)
WBC # BLD AUTO: 7.6 K/UL (ref 4.3–11.1)

## 2023-02-03 PROCEDURE — 96374 THER/PROPH/DIAG INJ IV PUSH: CPT

## 2023-02-03 PROCEDURE — 71045 X-RAY EXAM CHEST 1 VIEW: CPT

## 2023-02-03 PROCEDURE — 6370000000 HC RX 637 (ALT 250 FOR IP): Performed by: EMERGENCY MEDICINE

## 2023-02-03 PROCEDURE — 6360000002 HC RX W HCPCS: Performed by: EMERGENCY MEDICINE

## 2023-02-03 PROCEDURE — 80053 COMPREHEN METABOLIC PANEL: CPT

## 2023-02-03 PROCEDURE — 99285 EMERGENCY DEPT VISIT HI MDM: CPT

## 2023-02-03 PROCEDURE — 85025 COMPLETE CBC W/AUTO DIFF WBC: CPT

## 2023-02-03 PROCEDURE — 94640 AIRWAY INHALATION TREATMENT: CPT

## 2023-02-03 PROCEDURE — 84484 ASSAY OF TROPONIN QUANT: CPT

## 2023-02-03 PROCEDURE — 83880 ASSAY OF NATRIURETIC PEPTIDE: CPT

## 2023-02-03 PROCEDURE — 83735 ASSAY OF MAGNESIUM: CPT

## 2023-02-03 PROCEDURE — 85610 PROTHROMBIN TIME: CPT

## 2023-02-03 PROCEDURE — 93005 ELECTROCARDIOGRAM TRACING: CPT | Performed by: EMERGENCY MEDICINE

## 2023-02-03 RX ORDER — PREDNISONE 20 MG/1
20 TABLET ORAL DAILY
Qty: 5 TABLET | Refills: 0 | Status: SHIPPED | OUTPATIENT
Start: 2023-02-03 | End: 2023-02-08

## 2023-02-03 RX ORDER — BENZONATATE 200 MG/1
200 CAPSULE ORAL 3 TIMES DAILY PRN
Qty: 15 CAPSULE | Refills: 0 | Status: SHIPPED | OUTPATIENT
Start: 2023-02-03 | End: 2023-02-10

## 2023-02-03 RX ORDER — AMOXICILLIN AND CLAVULANATE POTASSIUM 875; 125 MG/1; MG/1
1 TABLET, FILM COATED ORAL 2 TIMES DAILY
Qty: 20 TABLET | Refills: 0 | Status: SHIPPED | OUTPATIENT
Start: 2023-02-03 | End: 2023-02-13

## 2023-02-03 RX ORDER — DEXAMETHASONE SODIUM PHOSPHATE 10 MG/ML
10 INJECTION INTRAMUSCULAR; INTRAVENOUS
Status: COMPLETED | OUTPATIENT
Start: 2023-02-03 | End: 2023-02-03

## 2023-02-03 RX ORDER — PREDNISONE 10 MG/1
20 TABLET ORAL
Status: COMPLETED | OUTPATIENT
Start: 2023-02-03 | End: 2023-02-03

## 2023-02-03 RX ORDER — IPRATROPIUM BROMIDE AND ALBUTEROL SULFATE 2.5; .5 MG/3ML; MG/3ML
1 SOLUTION RESPIRATORY (INHALATION)
Status: COMPLETED | OUTPATIENT
Start: 2023-02-03 | End: 2023-02-03

## 2023-02-03 RX ADMIN — DEXAMETHASONE SODIUM PHOSPHATE 10 MG: 10 INJECTION, SOLUTION INTRAMUSCULAR; INTRAVENOUS at 20:44

## 2023-02-03 RX ADMIN — IPRATROPIUM BROMIDE AND ALBUTEROL SULFATE 1 AMPULE: .5; 3 SOLUTION RESPIRATORY (INHALATION) at 22:37

## 2023-02-03 RX ADMIN — PREDNISONE 10 MG: 10 TABLET ORAL at 22:59

## 2023-02-03 RX ADMIN — IPRATROPIUM BROMIDE AND ALBUTEROL SULFATE 1 AMPULE: .5; 3 SOLUTION RESPIRATORY (INHALATION) at 20:36

## 2023-02-03 ASSESSMENT — ENCOUNTER SYMPTOMS
WHEEZING: 1
BACK PAIN: 0
DIARRHEA: 0
VOMITING: 0
ABDOMINAL PAIN: 0
COUGH: 1
BLOOD IN STOOL: 0
SHORTNESS OF BREATH: 1
COLOR CHANGE: 0

## 2023-02-03 ASSESSMENT — LIFESTYLE VARIABLES
HOW MANY STANDARD DRINKS CONTAINING ALCOHOL DO YOU HAVE ON A TYPICAL DAY: PATIENT DOES NOT DRINK
HOW OFTEN DO YOU HAVE A DRINK CONTAINING ALCOHOL: NEVER

## 2023-02-03 ASSESSMENT — PAIN - FUNCTIONAL ASSESSMENT
PAIN_FUNCTIONAL_ASSESSMENT: NONE - DENIES PAIN
PAIN_FUNCTIONAL_ASSESSMENT: NONE - DENIES PAIN

## 2023-02-04 LAB
EKG ATRIAL RATE: 73 BPM
EKG DIAGNOSIS: NORMAL
EKG P AXIS: 59 DEGREES
EKG P-R INTERVAL: 136 MS
EKG Q-T INTERVAL: 404 MS
EKG QRS DURATION: 92 MS
EKG QTC CALCULATION (BAZETT): 445 MS
EKG R AXIS: 77 DEGREES
EKG T AXIS: 90 DEGREES
EKG VENTRICULAR RATE: 73 BPM

## 2023-02-04 NOTE — ED NOTES
I have reviewed discharge instructions with the patient. The patient verbalized understanding. Patient left ED via Discharge Method: ambulatory to Home with family member  Opportunity for questions and clarification provided. Patient given 3 scripts.             Andrew Watson RN  02/03/23 0348

## 2023-02-04 NOTE — ED PROVIDER NOTES
Emergency Department Provider Note                   PCP:                CORIE Casarez               Age: 57 y.o. Sex: female       ICD-10-CM    1. COPD with acute exacerbation ([facility] [address]  J44.1       2. Acute chest pain  R07.9           DISPOSITION Decision To Discharge 02/03/2023 10:33:55 PM        Medical Decision Making  Shortness of breath with left pleurisy. Patient on Coumadin. If therapeutic INR, doubt pulmonary embolism. Check chest x-ray for pneumonia or effusion or infiltrate        Problems Addressed:  COPD with acute exacerbation ([facility] [address]): acute illness or injury    Amount and/or Complexity of Data Reviewed  External Data Reviewed: notes. Details: [facility] pulmonology note. Called in amoxicillin today. Not oxygen dependent for COPD. Cardiology note reviewed reveals patient has stable ascending thoracic aneurysm  Labs: ordered. Details: Reviewed labs are normal  Radiology: ordered and independent interpretation performed. Details: Reviewed x-ray. Agree with radiologist.  No infiltrate  ECG/medicine tests: ordered and independent interpretation performed. Details: Interpretation of EKG shows normal sinus rhythm 73. No ST-T changes. No ectopy. Normal QT interval    Risk  Prescription drug management. Complexity of Problem: 2 or more acute complicated illnesses (4)    I have conducted an independent ordering and review of Labs. I have conducted an independent ordering and review of EKG. I have conducted an independent ordering and review of X-rays. I have reviewed records from an external source: provider visit notes from outside specialist.  Considerations: The following labs and/or imaging studies were considered but not ordered: D-dimer.   Doubt pulmonary embolism since patient is on Coumadin                 Orders Placed This Encounter   Procedures    XR CHEST PORTABLE    CBC with Auto Differential    Comprehensive Metabolic Panel    Magnesium    Protime-INR Troponin    Brain Natriuretic Peptide    EKG 12 Lead    Insert peripheral IV        Medications   ipratropium-albuterol (DUONEB) nebulizer solution 1 ampule (1 ampule Inhalation Given 2/3/23 2036)   dexamethasone (DECADRON) injection 10 mg (10 mg IntraVENous Given 2/3/23 2044)   ipratropium-albuterol (DUONEB) nebulizer solution 1 ampule (1 ampule Inhalation Given 2/3/23 2237)   predniSONE (DELTASONE) tablet 20 mg (10 mg Oral Given 2/3/23 2259)       Discharge Medication List as of 2/3/2023 10:59 PM        START taking these medications    Details   amoxicillin-clavulanate (AUGMENTIN) 875-125 MG per tablet Take 1 tablet by mouth 2 times daily for 10 days, Disp-20 tablet, R-0Print      predniSONE (DELTASONE) 20 MG tablet Take 1 tablet by mouth daily for 5 days, Disp-5 tablet, R-0Print      benzonatate (TESSALON) 200 MG capsule Take 1 capsule by mouth 3 times daily as needed for Cough, Disp-15 capsule, R-0Print              Revonda BANDAR Kumar is a 57 y.o. female who presents to the Emergency Department with chief complaint of    Chief Complaint   Patient presents with    Shortness of Breath      75-year-old female has a history of COPD. Also history of aortic valve replacement. Increasing shortness of breath for 3 days with cough with yellow sputum. Some left pleurisy today. Some increasing shortness of breath and some wheeze. Tried inhaler at home without much improvement. Slight fever. No chills. No vomiting or diarrhea. States takes Coumadin. Spoke with her pulmonologist today    The history is provided by the patient. Review of Systems   Constitutional:  Positive for fatigue and fever. Negative for chills. Respiratory:  Positive for cough, shortness of breath and wheezing. Cardiovascular:  Positive for chest pain. Negative for palpitations and leg swelling. Gastrointestinal:  Negative for abdominal pain, blood in stool, diarrhea and vomiting.    Musculoskeletal:  Negative for back pain and neck pain. Skin:  Negative for color change and rash. Past Medical History:   Diagnosis Date    Abnormal EKG 3/25/2016    Aortic valve stenosis, congenital 3/25/2016    Chest pain 3/25/2016    COPD (chronic obstructive pulmonary disease) ([facility] [address] 3/25/2016    Essential hypertension 3/25/2016    Hyperlipidemia 0/07/5282    Systolic congestive heart failure ([facility] [address] 3/25/2016        Past Surgical History:   Procedure Laterality Date    CARDIAC VALVE SURGERY          No family history on file. Social History     Socioeconomic History    Marital status:    Tobacco Use    Smoking status: Former    Smokeless tobacco: Never   Substance and Sexual Activity    Alcohol use: No         Cefaclor, Clindamycin, Erythromycin, Levofloxacin, Moxifloxacin, Ofloxacin, Sulfa antibiotics, and Sulfamethoxazole-trimethoprim     Discharge Medication List as of 2/3/2023 10:59 PM        CONTINUE these medications which have NOT CHANGED    Details   !! warfarin (COUMADIN) 4 MG tablet Take 1 tablet by mouth daily Take 1 tab everyday or as directed, Disp-90 tablet, R-3Normal      !! warfarin (COUMADIN) 5 MG tablet Take 1 to 2  tabs, once or twice a week or as directed by [place]  [facility], Disp-24 tablet, R-3Normal      traZODone (DESYREL) 150 MG tablet Take 150 mg by mouthHistorical Med      albuterol sulfate  (90 Base) MCG/ACT inhaler Inhale into the lungsHistorical Med      aspirin 81 MG EC tablet Take by mouth dailyHistorical Med      budesonide-formoterol (SYMBICORT) 160-4.5 MCG/ACT AERO Inhale 2 puffs into the lungs 2 times dailyHistorical Med      clonazePAM (KLONOPIN) 1 MG tablet 0.5 mg 3 times daily. Historical Med      Ertugliflozin L-PyroglutamicAc 15 MG TABS Take 15 mg by mouth dailyHistorical Med      esomeprazole (NEXIUM) 40 MG delayed release capsule Take by mouth 2 times dailyHistorical Med      metFORMIN (GLUCOPHAGE) 500 MG tablet Take 500 mg by mouth 3 times daily (with meals)Historical Med      montelukast (SINGULAIR) 10 MG tablet Take 10 mg by mouth dailyHistorical Med      simvastatin (ZOCOR) 40 MG tablet Take by mouthHistorical Med       !! - Potential duplicate medications found. Please discuss with provider. Vitals signs and nursing note reviewed. Patient Vitals for the past 4 hrs:   Temp Pulse Resp BP SpO2   02/03/23 2259 -- 82 19 112/61 94 %   02/03/23 2249 -- -- -- -- 94 %   02/03/23 2152 -- 71 20 114/65 97 %   02/03/23 2037 -- -- -- -- 93 %   02/03/23 2001 97.9 °F (36.6 °C) 83 20 (!) 143/76 93 %          Physical Exam  Vitals and nursing note reviewed. Constitutional:       Appearance: She is not ill-appearing. HENT:      Head: Normocephalic and atraumatic. Right Ear: External ear normal.      Left Ear: External ear normal.      Mouth/Throat:      Mouth: Mucous membranes are moist.      Pharynx: Oropharynx is clear. Eyes:      General: No scleral icterus. Extraocular Movements: Extraocular movements intact. Pupils: Pupils are equal, round, and reactive to light. Cardiovascular:      Rate and Rhythm: Normal rate and regular rhythm. Pulmonary:      Effort: Pulmonary effort is normal. No respiratory distress. Breath sounds: Decreased breath sounds and wheezing present. Abdominal:      Palpations: Abdomen is soft. Tenderness: There is no abdominal tenderness. Musculoskeletal:         General: No swelling or tenderness. Right lower leg: No edema. Left lower leg: No edema. Skin:     General: Skin is warm and dry. Neurological:      General: No focal deficit present. Mental Status: She is alert. Procedures    Results for orders placed or performed during the hospital encounter of 02/03/23   XR CHEST PORTABLE    Narrative    Chest X-ray    INDICATION: Chest pain    AP/PA view of the chest was obtained. FINDINGS: The lungs are clear. There are no infiltrates or effusions. The heart  size is normal.  There are sternotomy changes. Impression    No acute findings in the chest     CBC with Auto Differential   Result Value Ref Range    WBC 7.6 4.3 - 11.1 K/uL    RBC 4.46 4.05 - 5.2 M/uL    Hemoglobin 13.3 11.7 - 15.4 g/dL    Hematocrit 42.1 35.8 - 46.3 %    MCV 94.4 82 - 102 FL    MCH 29.8 26.1 - 32.9 PG    MCHC 31.6 31.4 - 35.0 g/dL    RDW 14.5 11.9 - 14.6 %    Platelets 782 198 - 426 K/uL    MPV 9.3 (L) 9.4 - 12.3 FL    nRBC 0.00 0.0 - 0.2 K/uL    Differential Type AUTOMATED      Seg Neutrophils 47 43 - 78 %    Lymphocytes 42 13 - 44 %    Monocytes 6 4.0 - 12.0 %    Eosinophils % 4 0.5 - 7.8 %    Basophils 1 0.0 - 2.0 %    Immature Granulocytes 0 0.0 - 5.0 %    Segs Absolute 3.6 1.7 - 8.2 K/UL    Absolute Lymph # 3.2 0.5 - 4.6 K/UL    Absolute Mono # 0.5 0.1 - 1.3 K/UL    Absolute Eos # 0.3 0.0 - 0.8 K/UL    Basophils Absolute 0.1 0.0 - 0.2 K/UL    Absolute Immature Granulocyte 0.0 0.0 - 0.5 K/UL   Comprehensive Metabolic Panel   Result Value Ref Range    Sodium 140 133 - 143 mmol/L    Potassium 4.0 3.5 - 5.1 mmol/L    Chloride 104 101 - 110 mmol/L    CO2 25 21 - 32 mmol/L    Anion Gap 11 2 - 11 mmol/L    Glucose 116 (H) 65 - 100 mg/dL    BUN 25 (H) 8 - 23 MG/DL    Creatinine 0.90 0.6 - 1.0 MG/DL    Est, Glom Filt Rate >60 >60 ml/min/1.73m2    Calcium 9.2 8.3 - 10.4 MG/DL    Total Bilirubin 0.2 0.2 - 1.1 MG/DL    ALT 30 12 - 65 U/L    AST 15 15 - 37 U/L    Alk Phosphatase 67 50 - 136 U/L    Total Protein 7.2 6.3 - 8.2 g/dL    Albumin 3.9 3.2 - 4.6 g/dL    Globulin 3.3 2.8 - 4.5 g/dL    Albumin/Globulin Ratio 1.2 0.4 - 1.6     Magnesium   Result Value Ref Range    Magnesium 2.0 1.8 - 2.4 mg/dL   Protime-INR   Result Value Ref Range    Protime 20.1 (H) 12.6 - 14.3 sec    INR 1.7     Troponin   Result Value Ref Range    Troponin, High Sensitivity 6.9 0 - 14 pg/mL   Brain Natriuretic Peptide   Result Value Ref Range    NT Pro- (H) 5 - 125 PG/ML   EKG 12 Lead   Result Value Ref Range    Ventricular Rate 73 BPM    Atrial Rate 73 BPM    P-R Interval 136 ms    QRS Duration 92 ms    Q-T Interval 404 ms    QTc Calculation (Bazett) 445 ms    P Axis 59 degrees    R Axis 77 degrees    T Axis 90 degrees    Diagnosis Normal sinus rhythm         XR CHEST PORTABLE   Final Result   No acute findings in the chest            Results Include:    Recent Results (from the past 24 hour(s))   EKG 12 Lead    Collection Time: 02/03/23  8:04 PM   Result Value Ref Range    Ventricular Rate 73 BPM    Atrial Rate 73 BPM    P-R Interval 136 ms    QRS Duration 92 ms    Q-T Interval 404 ms    QTc Calculation (Bazett) 445 ms    P Axis 59 degrees    R Axis 77 degrees    T Axis 90 degrees    Diagnosis Normal sinus rhythm    CBC with Auto Differential    Collection Time: 02/03/23  8:30 PM   Result Value Ref Range    WBC 7.6 4.3 - 11.1 K/uL    RBC 4.46 4.05 - 5.2 M/uL    Hemoglobin 13.3 11.7 - 15.4 g/dL    Hematocrit 42.1 35.8 - 46.3 %    MCV 94.4 82 - 102 FL    MCH 29.8 26.1 - 32.9 PG    MCHC 31.6 31.4 - 35.0 g/dL    RDW 14.5 11.9 - 14.6 %    Platelets 041 465 - 827 K/uL    MPV 9.3 (L) 9.4 - 12.3 FL    nRBC 0.00 0.0 - 0.2 K/uL    Differential Type AUTOMATED      Seg Neutrophils 47 43 - 78 %    Lymphocytes 42 13 - 44 %    Monocytes 6 4.0 - 12.0 %    Eosinophils % 4 0.5 - 7.8 %    Basophils 1 0.0 - 2.0 %    Immature Granulocytes 0 0.0 - 5.0 %    Segs Absolute 3.6 1.7 - 8.2 K/UL    Absolute Lymph # 3.2 0.5 - 4.6 K/UL    Absolute Mono # 0.5 0.1 - 1.3 K/UL    Absolute Eos # 0.3 0.0 - 0.8 K/UL    Basophils Absolute 0.1 0.0 - 0.2 K/UL    Absolute Immature Granulocyte 0.0 0.0 - 0.5 K/UL   Comprehensive Metabolic Panel    Collection Time: 02/03/23  8:30 PM   Result Value Ref Range    Sodium 140 133 - 143 mmol/L    Potassium 4.0 3.5 - 5.1 mmol/L    Chloride 104 101 - 110 mmol/L    CO2 25 21 - 32 mmol/L    Anion Gap 11 2 - 11 mmol/L    Glucose 116 (H) 65 - 100 mg/dL    BUN 25 (H) 8 - 23 MG/DL    Creatinine 0.90 0.6 - 1.0 MG/DL    Est, Glom Filt Rate >60 >60 ml/min/1.73m2    Calcium 9.2 8.3 - 10.4 MG/DL    Total Bilirubin 0.2 0.2 - 1.1 MG/DL    ALT 30 12 - 65 U/L    AST 15 15 - 37 U/L    Alk Phosphatase 67 50 - 136 U/L    Total Protein 7.2 6.3 - 8.2 g/dL    Albumin 3.9 3.2 - 4.6 g/dL    Globulin 3.3 2.8 - 4.5 g/dL    Albumin/Globulin Ratio 1.2 0.4 - 1.6     Magnesium    Collection Time: 02/03/23  8:30 PM   Result Value Ref Range    Magnesium 2.0 1.8 - 2.4 mg/dL   Protime-INR    Collection Time: 02/03/23  8:30 PM   Result Value Ref Range    Protime 20.1 (H) 12.6 - 14.3 sec    INR 1.7     Troponin    Collection Time: 02/03/23  8:30 PM   Result Value Ref Range    Troponin, High Sensitivity 6.9 0 - 14 pg/mL   Brain Natriuretic Peptide    Collection Time: 02/03/23  8:30 PM   Result Value Ref Range    NT Pro- (H) 5 - 125 PG/ML     XR CHEST PORTABLE    Result Date: 2/3/2023  Chest X-ray INDICATION: Chest pain AP/PA view of the chest was obtained. FINDINGS: The lungs are clear. There are no infiltrates or effusions. The heart size is normal.  There are sternotomy changes. No acute findings in the chest    States INR should be be between 1.7 and 2.5. States takes higher dose of Coumadin tonight. Also will be placed on prednisone as she says that increases INR for her as well. Feels better and able to go home. Will place on antibiotics and prednisone and Tessalon                 Voice dictation software was used during the making of this note. This software is not perfect and grammatical and other typographical errors may be present. This note has not been completely proofread for errors.      Louise Luis MD  02/03/23 9077

## 2023-02-04 NOTE — ED TRIAGE NOTES
Patient arrives ambulatory to triage c/o SOB x3 days. Also c/o productive cough, L sided mild chest pain, wheezing. Denies fever, chills. A+Ox4.  NAD

## 2023-02-04 NOTE — DISCHARGE INSTRUCTIONS
Continue use inhaler with spacer at home every 4-6 hours next 2 to 3 days. Start prednisone tomorrow. Take antibiotic until complete.   Recheck sooner for worse or worrisome symptoms including worse shortness of breath high fever or worsening chest pain

## 2023-02-17 ENCOUNTER — ANTI-COAG VISIT (OUTPATIENT)
Dept: CARDIOLOGY CLINIC | Age: 63
End: 2023-02-17

## 2023-02-17 ENCOUNTER — NURSE ONLY (OUTPATIENT)
Dept: CARDIOLOGY CLINIC | Age: 63
End: 2023-02-17

## 2023-02-17 VITALS
SYSTOLIC BLOOD PRESSURE: 110 MMHG | DIASTOLIC BLOOD PRESSURE: 60 MMHG | HEART RATE: 80 BPM | BODY MASS INDEX: 24.15 KG/M2 | WEIGHT: 123 LBS | HEIGHT: 60 IN

## 2023-02-17 DIAGNOSIS — R07.9 CHEST PAIN: Primary | ICD-10-CM

## 2023-02-17 DIAGNOSIS — Z79.01 LONG TERM (CURRENT) USE OF ANTICOAGULANTS: Primary | ICD-10-CM

## 2023-02-17 DIAGNOSIS — Z95.2 AORTIC VALVE REPLACED: ICD-10-CM

## 2023-02-17 LAB
POC INR: 2.3
PROTHROMBIN TIME, POC: YES

## 2023-02-17 NOTE — PROGRESS NOTES
Anticoagulation Summary  As of 2/17/2023      INR goal:  1.5-2.5   TTR:  79.7 % (8.4 mo)   INR used for dosing:     Warfarin maintenance plan:  5 mg (5 mg x 1) every Fri; 4 mg (4 mg x 1) all other days; Starting 2/17/2023   Weekly warfarin total:  29 mg   Plan last modified:  Samy Senior MA (1/6/2023)   Next INR check:  3/17/2023   Target end date:   Indefinite    Indications    Long term (current) use of anticoagulants [Z79.01]  Aortic valve replaced [Z95.2]                 Anticoagulation Episode Summary       INR check location:  Anticoagulation Clinic    Preferred lab:      Send INR reminders to:  [facility]    Comments:  **PT RANGE IS 1.8-2.5**

## 2023-02-17 NOTE — PROGRESS NOTES
Dr. Farheen Vega reviewed today's ekg and compared the the ER. He ordered a Lexiscan to R/O CAD. Told the patient to go to the ER with re-occurring CP.   Ngozi Reed

## 2023-03-16 ENCOUNTER — ANTI-COAG VISIT (OUTPATIENT)
Dept: CARDIOLOGY CLINIC | Age: 63
End: 2023-03-16
Payer: MEDICAID

## 2023-03-16 DIAGNOSIS — Z95.2 AORTIC VALVE REPLACED: ICD-10-CM

## 2023-03-16 DIAGNOSIS — Z79.01 LONG TERM (CURRENT) USE OF ANTICOAGULANTS: Primary | ICD-10-CM

## 2023-03-16 LAB
POC INR: 2.41
PROTHROMBIN TIME, POC: NORMAL

## 2023-03-16 PROCEDURE — 85610 PROTHROMBIN TIME: CPT | Performed by: INTERNAL MEDICINE

## 2023-03-16 PROCEDURE — 93793 ANTICOAG MGMT PT WARFARIN: CPT | Performed by: INTERNAL MEDICINE

## 2023-03-16 NOTE — PATIENT INSTRUCTIONS
Reminder: Please contact the Coumadin Clinic at [phone]  when you have medication changes. Examples, new medications, antibiotics, discontinued medications, new supplements, missed doses of warfarin or if you took extra doses of warfarin. This also includes OTC medications. Notifying us helps reduce the possibility of high and low INR's. In addition, if warfarin needs to be held for any procedures, please have surgeon or physician's office contact us before holding anticoagulant. Thanks, [facility] Coumadin Clinic.

## 2023-03-16 NOTE — PROGRESS NOTES
Anticoagulation Summary  As of 3/16/2023      INR goal:  1.5-2.5   TTR:  81.7 % (9.3 mo)   INR used for dosin.41 (3/16/2023)   Warfarin maintenance plan:  5 mg (5 mg x 1) every Fri; 4 mg (4 mg x 1) all other days; Starting 3/16/2023   Weekly warfarin total:  29 mg   Plan last modified:  Ya Daugherty MA (2023)   Next INR check:  2023   Target end date:   Indefinite    Indications    Long term (current) use of anticoagulants [Z79.01]  Aortic valve replaced [Z95.2]                 Anticoagulation Episode Summary       INR check location:  Anticoagulation Clinic    Preferred lab:      Send INR reminders to:  [facility]    Comments:  **PT RANGE IS 1.8-2.5**

## 2023-05-11 ENCOUNTER — ANTI-COAG VISIT (OUTPATIENT)
Age: 63
End: 2023-05-11

## 2023-05-11 DIAGNOSIS — Z79.01 LONG TERM (CURRENT) USE OF ANTICOAGULANTS: Primary | ICD-10-CM

## 2023-05-11 DIAGNOSIS — Z95.2 AORTIC VALVE REPLACED: ICD-10-CM

## 2023-05-11 LAB
POC INR: 2.3
PROTHROMBIN TIME, POC: NORMAL

## 2023-05-11 NOTE — PATIENT INSTRUCTIONS
Reminder: Please contact the Coumadin Clinic at [phone]  when you have medication changes. Examples, new medications, antibiotics, discontinued medications, new supplements, missed doses of warfarin or if you took extra doses of warfarin. This also includes OTC medications. Notifying us helps reduce the possibility of high and low INR's. In addition, if warfarin needs to be held for any procedures, please have surgeon or physician's office contact us before holding anticoagulant. Thanks, [facility] Coumadin Clinic.

## 2023-05-11 NOTE — PROGRESS NOTES
Anticoagulation Summary  As of 2023      INR goal:  1.5-2.5   TTR:  84.7 % (11.1 mo)   INR used for dosin.3 (2023)   Warfarin maintenance plan:  5 mg (5 mg x 1) every Fri; 4 mg (4 mg x 1) all other days   Weekly warfarin total:  29 mg   Plan last modified:  Priti Frank MA (2023)   Next INR check:  2023   Target end date:   Indefinite    Indications    Long term (current) use of anticoagulants [Z79.01]  Aortic valve replaced [Z95.2]                 Anticoagulation Episode Summary       INR check location:  Anticoagulation Clinic    Preferred lab:      Send INR reminders to:  [facility]    Comments:  **PT RANGE IS 1.8-2.5**

## 2023-05-17 ENCOUNTER — OFFICE VISIT (OUTPATIENT)
Age: 63
End: 2023-05-17
Payer: MEDICAID

## 2023-05-17 VITALS
HEART RATE: 68 BPM | BODY MASS INDEX: 23.32 KG/M2 | SYSTOLIC BLOOD PRESSURE: 118 MMHG | DIASTOLIC BLOOD PRESSURE: 74 MMHG | WEIGHT: 119.4 LBS

## 2023-05-17 DIAGNOSIS — I35.0 NONRHEUMATIC AORTIC VALVE STENOSIS: Primary | ICD-10-CM

## 2023-05-17 DIAGNOSIS — J44.9 CHRONIC OBSTRUCTIVE PULMONARY DISEASE, UNSPECIFIED COPD TYPE (HCC): ICD-10-CM

## 2023-05-17 DIAGNOSIS — Z95.2 AORTIC VALVE REPLACED: ICD-10-CM

## 2023-05-17 DIAGNOSIS — I10 ESSENTIAL HYPERTENSION: ICD-10-CM

## 2023-05-17 DIAGNOSIS — I71.21 ANEURYSM OF ASCENDING AORTA WITHOUT RUPTURE (HCC): ICD-10-CM

## 2023-05-17 PROCEDURE — 99213 OFFICE O/P EST LOW 20 MIN: CPT | Performed by: INTERNAL MEDICINE

## 2023-05-17 PROCEDURE — 3074F SYST BP LT 130 MM HG: CPT | Performed by: INTERNAL MEDICINE

## 2023-05-17 PROCEDURE — 3078F DIAST BP <80 MM HG: CPT | Performed by: INTERNAL MEDICINE

## 2023-05-17 RX ORDER — DULOXETIN HYDROCHLORIDE 20 MG/1
20 CAPSULE, DELAYED RELEASE ORAL DAILY
COMMUNITY

## 2023-05-17 ASSESSMENT — ENCOUNTER SYMPTOMS
COLOR CHANGE: 0
SPUTUM PRODUCTION: 0
BOWEL INCONTINENCE: 0
HEMATOCHEZIA: 0
SHORTNESS OF BREATH: 1
ORTHOPNEA: 0
BLURRED VISION: 0
WHEEZING: 0
HEMATEMESIS: 0
ABDOMINAL PAIN: 0
DIARRHEA: 0
HOARSE VOICE: 0

## 2023-05-17 NOTE — PROGRESS NOTES
[address], [address]  PHONE: [phone]        23        NAME:  Nakia Kumar  : 1960  MRN: [id number]       SUBJECTIVE:   Shawanda Taylor is a 57 y.o. female seen for a follow up visit regarding the following: The patient has a hx of AVR,TAA,HFpEF,T2DM,and COPD. She reports an episode of unexplained dyspnea in 2023. She had follow up normal Lexiscan  and unremarkable echo ( normal LV EF with mild AS). She returns for follow up. Overall,she reports feeling well. Chief Complaint   Patient presents with    Chest Pain    Hyperlipidemia    Hypertension     6mth follow up       HPI:    Congestive Heart Failure  Presents for follow-up visit. Associated symptoms include shortness of breath. Pertinent negatives include no abdominal pain, chest pain, chest pressure, claudication, edema, fatigue, muscle weakness, near-syncope, nocturia, orthopnea, palpitations, paroxysmal nocturnal dyspnea or unexpected weight change. The symptoms have been stable. Past Medical History, Past Surgical History, Family history, Social History, and Medications were all reviewed with the patient today and updated as necessary.          Current Outpatient Medications:     DULoxetine (CYMBALTA) 20 MG extended release capsule, Take 1 capsule by mouth daily, Disp: , Rfl:     warfarin (COUMADIN) 4 MG tablet, Take 1 tablet by mouth daily Take 1 tab everyday or as directed, Disp: 90 tablet, Rfl: 3    warfarin (COUMADIN) 5 MG tablet, Take 1 to 2  tabs, once or twice a week or as directed by [facility]  [facility]iology, Disp: 24 tablet, Rfl: 3    traZODone (DESYREL) 150 MG tablet, Take 1 tablet by mouth, Disp: , Rfl:     albuterol sulfate  (90 Base) MCG/ACT inhaler, Inhale into the lungs, Disp: , Rfl:     aspirin 81 MG EC tablet, Take by mouth daily, Disp: , Rfl:     budesonide-formoterol (SYMBICORT) 160-4.5 MCG/ACT AERO, Inhale 2 puffs into the lungs 2 times daily,

## 2023-06-08 ENCOUNTER — ANTI-COAG VISIT (OUTPATIENT)
Age: 63
End: 2023-06-08
Payer: MEDICAID

## 2023-06-08 DIAGNOSIS — Z79.01 LONG TERM (CURRENT) USE OF ANTICOAGULANTS: Primary | ICD-10-CM

## 2023-06-08 DIAGNOSIS — Z95.2 AORTIC VALVE REPLACED: ICD-10-CM

## 2023-06-08 LAB
POC INR: 1.6
PROTHROMBIN TIME, POC: NORMAL

## 2023-06-08 PROCEDURE — 85610 PROTHROMBIN TIME: CPT | Performed by: INTERNAL MEDICINE

## 2023-06-08 PROCEDURE — 93793 ANTICOAG MGMT PT WARFARIN: CPT | Performed by: INTERNAL MEDICINE

## 2023-06-08 NOTE — PROGRESS NOTES
Pt is below range, will increase and recheck in 2 weeks//KM    Anticoagulation Summary  As of 2023      INR goal:  1.5-2.5   TTR:  85.9 % (1 y)   INR used for dosin.6 (2023)   Warfarin maintenance plan:  5 mg (5 mg x 1) every Fri; 4 mg (4 mg x 1) all other days   Weekly warfarin total:  29 mg   Plan last modified:  Theodore Stockton MA (2023)   Next INR check:  2023   Target end date:   Indefinite    Indications    Long term (current) use of anticoagulants [Z79.01]  Aortic valve replaced [Z95.2]                 Anticoagulation Episode Summary       INR check location:  Anticoagulation Clinic    Preferred lab:      Send INR reminders to:  [facility]    Comments:  **PT RANGE IS 1.8-2.5**

## 2023-06-08 NOTE — PATIENT INSTRUCTIONS
Reminder: Please contact the Coumadin Clinic at [phone]  when you have medication changes. Examples, new medications, antibiotics, discontinued medications, new supplements, missed doses of warfarin or if you took extra doses of warfarin. This also includes OTC medications. Notifying us helps reduce the possibility of high and low INR's. In addition, if warfarin needs to be held for any procedures, please have surgeon or physician's office contact us before holding anticoagulant. Thanks, [facility] Coumadin Clinic.

## 2023-06-16 ENCOUNTER — TELEPHONE (OUTPATIENT)
Age: 63
End: 2023-06-16

## 2023-06-16 NOTE — TELEPHONE ENCOUNTER
Yonatan Baldwin with [facility] health called stating she has questions in regard to service dates for the patient. Please call and advise. Tele  [phone]    Att: Yonatan Baldwin    Out of office on Mon 6/19 and Tues 6/20.

## 2023-06-23 ENCOUNTER — ANTI-COAG VISIT (OUTPATIENT)
Age: 63
End: 2023-06-23

## 2023-06-23 DIAGNOSIS — Z79.01 LONG TERM (CURRENT) USE OF ANTICOAGULANTS: Primary | ICD-10-CM

## 2023-06-23 DIAGNOSIS — Z95.2 AORTIC VALVE REPLACED: ICD-10-CM

## 2023-06-23 LAB
POC INR: 1.9
PROTHROMBIN TIME, POC: NORMAL

## 2023-06-23 NOTE — PROGRESS NOTES
Pt state that pharmacy gave her the wrong mg . She is now taking the correct mg. Anticoagulation Summary  As of 2023      INR goal:  1.5-2.5   TTR:  86.5 % (1 y)   INR used for dosin.9 (2023)   Warfarin maintenance plan:  5 mg (5 mg x 1) every Fri; 4 mg (4 mg x 1) all other days   Weekly warfarin total:  29 mg   Plan last modified:  Lina Yancey MA (2023)   Next INR check:  2023   Target end date:   Indefinite    Indications    Long term (current) use of anticoagulants [Z79.01]  Aortic valve replaced [Z95.2]                 Anticoagulation Episode Summary       INR check location:  Anticoagulation Clinic    Preferred lab:      Send INR reminders to:  [facility] CARDIOLOGY [facility]    Comments:  **PT RANGE IS 1.8-2.5**

## 2023-06-23 NOTE — PATIENT INSTRUCTIONS
Reminder: Please contact the Coumadin Clinic at [phone]  when you have medication changes. Examples, new medications, antibiotics, discontinued medications, new supplements, missed doses of warfarin or if you took extra doses of warfarin. This also includes OTC medications. Notifying us helps reduce the possibility of high and low INR's. In addition, if warfarin needs to be held for any procedures, please have surgeon or physician's office contact us before holding anticoagulant. Thanks, [facility] Cardiology Coumadin Clinic.

## 2023-06-26 ENCOUNTER — ANTI-COAG VISIT (OUTPATIENT)
Age: 63
End: 2023-06-26

## 2023-06-26 DIAGNOSIS — Z79.01 LONG TERM (CURRENT) USE OF ANTICOAGULANTS: Primary | ICD-10-CM

## 2023-06-26 DIAGNOSIS — Z95.2 AORTIC VALVE REPLACED: ICD-10-CM

## 2023-06-30 ENCOUNTER — ANTI-COAG VISIT (OUTPATIENT)
Age: 63
End: 2023-06-30

## 2023-06-30 DIAGNOSIS — Z79.01 LONG TERM (CURRENT) USE OF ANTICOAGULANTS: Primary | ICD-10-CM

## 2023-06-30 DIAGNOSIS — Z95.2 AORTIC VALVE REPLACED: ICD-10-CM

## 2023-06-30 LAB
POC INR: 2.4
PROTHROMBIN TIME, POC: NORMAL

## 2023-07-13 ENCOUNTER — ANTI-COAG VISIT (OUTPATIENT)
Age: 63
End: 2023-07-13
Payer: MEDICAID

## 2023-07-13 DIAGNOSIS — Z95.2 AORTIC VALVE REPLACED: ICD-10-CM

## 2023-07-13 DIAGNOSIS — Z79.01 LONG TERM (CURRENT) USE OF ANTICOAGULANTS: Primary | ICD-10-CM

## 2023-07-13 LAB
POC INR: 1.6
PROTHROMBIN TIME, POC: NORMAL

## 2023-07-13 PROCEDURE — 85610 PROTHROMBIN TIME: CPT | Performed by: INTERNAL MEDICINE

## 2023-07-13 PROCEDURE — 93793 ANTICOAG MGMT PT WARFARIN: CPT | Performed by: INTERNAL MEDICINE

## 2023-07-13 NOTE — PATIENT INSTRUCTIONS
Reminder: Please contact the Coumadin Clinic at [phone]  when you have medication changes. Examples, new medications, antibiotics, discontinued medications, new supplements, missed doses of warfarin or if you took extra doses of warfarin. This also includes OTC medications. Notifying us helps reduce the possibility of high and low INR's. In addition, if warfarin needs to be held for any procedures, please have surgeon or physician's office contact us before holding anticoagulant. Thanks, [facility] Cardiology Coumadin Clinic.

## 2023-07-13 NOTE — PROGRESS NOTES
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

## 2023-07-27 ENCOUNTER — ANTI-COAG VISIT (OUTPATIENT)
Age: 63
End: 2023-07-27

## 2023-07-27 DIAGNOSIS — Z79.01 LONG TERM (CURRENT) USE OF ANTICOAGULANTS: Primary | ICD-10-CM

## 2023-07-27 DIAGNOSIS — Z95.2 AORTIC VALVE REPLACED: ICD-10-CM

## 2023-07-27 LAB
POC INR: 1.6
PROTHROMBIN TIME, POC: YES

## 2023-07-27 RX ORDER — WARFARIN SODIUM 5 MG/1
TABLET ORAL
Qty: 24 TABLET | Refills: 3 | Status: SHIPPED | OUTPATIENT
Start: 2023-07-27

## 2023-07-27 NOTE — PROGRESS NOTES
81 year old male evaluated today for post procedure f/u visit for hx of constipation  Colonoscopy - 10/11- ileum normal, Lipomatous IC valve, 5 mm cecum, (6) 5-7 mm ascending polyps, 10 mm ascending polyp, (3) 6-7 mm transverse, 10mm trasnverse, 8 mm descending, Medium lipoma, Diverticulosis in sigmoid colon, normal mucosa and IH.  Pathology is still pending.   Labs revealed WSR 65, CRP 20, normal fecal calprotectin and moderately low fecal elastase. Celiac panel normal. Ova/parasites normal.   Today patient reports that diarrhea stopped last Friday, 10/14. He also reports that was the last BM he had. Pt continues Questran BID. Pt states the minute he started medication is really improved diarrhea. Pt denies abdominal pain but still reports a decreased appetite. Pt has had a 4 lb weight gain since last visit. Pt denies N/V or any other GI sx. He is able to eat mostly anything which is also improved but can't eat much without getting full.   Pt recently admitted to [facility] from 9/19-9/23 for diarrhea, DIAMOND, hyponatremia and hypokelmia. Pt sx had been present for 2 weeks prior to admission. Pt denies associated abdominal pain but reports decreased appetite.  CT 9/20 Findings suggestive of diarrheal illness. CT 9/26 There is gas and fluid in the colon consistent with diarrhea state. No focal wall inflammation is seen. GI PCR negative  On Discharge -Hgb 12.7, WBC 11.7   CT 9/26 There is gas and fluid in the colon consistent with diarrhea state. No focal wall inflammation is seen  labs Hgb 12.7, WBC 11.7  Questran was prescribed   Pt denies infection, travel out of the country, abx use, or new medications. Pt denies hx of DM. Changed dosage. Increased by 6%. Will recheck next Friday per pt request.   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 Cardiology [phone]

## 2023-07-27 NOTE — TELEPHONE ENCOUNTER
Requested Prescriptions     Pending Prescriptions Disp Refills    warfarin (COUMADIN) 5 MG tablet [Pharmacy Med Name: WARFARIN SODIUM 5 MG TABLET 5 Tablet] 24 tablet 3     Sig: TAKE 1-2 TABLETS ONCE OR TWICE A WEEK OR AS DIRECTED BY [facility]

## 2023-07-27 NOTE — PATIENT INSTRUCTIONS
Reminder: Please contact the Coumadin Clinic at [phone]  when you have medication changes. Examples, new medications, antibiotics, discontinued medications, new supplements, missed doses of warfarin or if you took extra doses of warfarin. This also includes OTC medications. Notifying us helps reduce the possibility of high and low INR's. In addition, if warfarin needs to be held for any procedures, please have surgeon or physician's office contact us before holding anticoagulant. Thanks, [facility] Cardiology Coumadin Clinic.

## 2023-08-04 ENCOUNTER — ANTI-COAG VISIT (OUTPATIENT)
Age: 63
End: 2023-08-04

## 2023-08-04 DIAGNOSIS — Z95.2 AORTIC VALVE REPLACED: ICD-10-CM

## 2023-08-04 DIAGNOSIS — Z79.01 LONG TERM (CURRENT) USE OF ANTICOAGULANTS: Primary | ICD-10-CM

## 2023-08-04 LAB
POC INR: 1.9
PROTHROMBIN TIME, POC: YES

## 2023-08-04 NOTE — PROGRESS NOTES
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

## 2023-08-04 NOTE — PATIENT INSTRUCTIONS
Reminder: Please contact the Coumadin Clinic at [phone]  when you have medication changes. Examples, new medications, antibiotics, discontinued medications, new supplements, missed doses of warfarin or if you took extra doses of warfarin. This also includes OTC medications. Notifying us helps reduce the possibility of high and low INR's. In addition, if warfarin needs to be held for any procedures, please have surgeon or physician's office contact us before holding anticoagulant. Thanks, [facility] Cardiology Coumadin Clinic.

## 2023-08-18 ENCOUNTER — ANTI-COAG VISIT (OUTPATIENT)
Age: 63
End: 2023-08-18

## 2023-08-18 DIAGNOSIS — Z95.2 AORTIC VALVE REPLACED: ICD-10-CM

## 2023-08-18 DIAGNOSIS — Z79.01 LONG TERM (CURRENT) USE OF ANTICOAGULANTS: Primary | ICD-10-CM

## 2023-08-18 LAB
POC INR: 2.2
PROTHROMBIN TIME, POC: YES

## 2023-08-18 NOTE — PATIENT INSTRUCTIONS
Reminder: Please contact the Coumadin Clinic at [phone]  when you have medication changes. Examples, new medications, antibiotics, discontinued medications, new supplements, missed doses of warfarin or if you took extra doses of warfarin. This also includes OTC medications. Notifying us helps reduce the possibility of high and low INR's. In addition, if warfarin needs to be held for any procedures, please have surgeon or physician's office contact us before holding anticoagulant. Thanks, [facility] Cardiology Coumadin Clinic.

## 2023-08-18 NOTE — PROGRESS NOTES
Anticoagulation Summary  As of 8/18/2023      INR goal:  1.5-2.5   TTR:  87.8 % (1.1 y)   INR used for dosing:     Plan last modified:  Doris Schumacher MA (7/27/2023)   Next INR check:     Target end date:   Indefinite    Indications    Long term (current) use of anticoagulants [Z79.01]  Aortic valve replaced [Z95.2]                 Anticoagulation Episode Summary       INR check location:  Anticoagulation Clinic    Preferred lab:      Send INR reminders to:  [facility] CARDIOLOGY [facility] PT    Comments:  **PT RANGE IS 1.8-2.5**          Anticoagulation Care Providers       Provider Role Specialty Phone number    Rachana To MD [facility] Cardiology [phone]

## 2023-09-11 ENCOUNTER — ANTI-COAG VISIT (OUTPATIENT)
Age: 63
End: 2023-09-11

## 2023-09-11 DIAGNOSIS — Z95.2 AORTIC VALVE REPLACED: ICD-10-CM

## 2023-09-11 DIAGNOSIS — Z79.01 LONG TERM (CURRENT) USE OF ANTICOAGULANTS: Primary | ICD-10-CM

## 2023-09-21 ENCOUNTER — ANTI-COAG VISIT (OUTPATIENT)
Age: 63
End: 2023-09-21

## 2023-09-21 DIAGNOSIS — Z95.2 AORTIC VALVE REPLACED: ICD-10-CM

## 2023-09-21 DIAGNOSIS — Z79.01 LONG TERM (CURRENT) USE OF ANTICOAGULANTS: Primary | ICD-10-CM

## 2023-09-21 LAB
POC INR: 2.9
PROTHROMBIN TIME, POC: NORMAL

## 2023-09-21 NOTE — PATIENT INSTRUCTIONS
Reminder: Please contact the Coumadin Clinic at [phone]  when you have medication changes. Examples, new medications, antibiotics, discontinued medications, new supplements, missed doses of warfarin or if you took extra doses of warfarin. This also includes OTC medications. Notifying us helps reduce the possibility of high and low INR's. In addition, if warfarin needs to be held for any procedures, please have surgeon or physician's office contact us before holding anticoagulant. Thanks, [facility] Cardiology Coumadin Clinic.

## 2023-10-05 ENCOUNTER — ANTI-COAG VISIT (OUTPATIENT)
Age: 63
End: 2023-10-05
Payer: MEDICAID

## 2023-10-05 DIAGNOSIS — Z79.01 LONG TERM (CURRENT) USE OF ANTICOAGULANTS: Primary | ICD-10-CM

## 2023-10-05 DIAGNOSIS — Z95.2 AORTIC VALVE REPLACED: ICD-10-CM

## 2023-10-05 LAB
POC INR: 3.6
PROTHROMBIN TIME, POC: NORMAL

## 2023-10-05 PROCEDURE — 93793 ANTICOAG MGMT PT WARFARIN: CPT | Performed by: INTERNAL MEDICINE

## 2023-10-05 PROCEDURE — 85610 PROTHROMBIN TIME: CPT | Performed by: INTERNAL MEDICINE

## 2023-10-05 NOTE — PROGRESS NOTES
Pt below range. Denies any changes to meds, or diet. Changed dose permanently and will recheck in 2 weeks. Anticoagulation Summary  As of 10/5/2023      INR goal:  1.5-2.5   TTR:  82.4 % (1.3 y)   INR used for dosing:  3.6 (10/5/2023)   Warfarin maintenance plan:  5 mg (5 mg x 1) every Wed; 4 mg (4 mg x 1) all other days   Weekly warfarin total:  29 mg   Plan last modified:  Tia New MA (10/5/2023)   Next INR check:  10/19/2023   Target end date:   Indefinite    Indications    Long term (current) use of anticoagulants [Z79.01]  Aortic valve replaced [Z95.2]                 Anticoagulation Episode Summary       INR check location:  Anticoagulation Clinic    Preferred lab:      Send INR reminders to:  [facility] CARDIOLOGY [facility] PT    Comments:  **PT RANGE IS 1.8-2.5**          Anticoagulation Care Providers       Provider Role Specialty Phone number    Etta Rdz MD [facility] Cardiology [phone]

## 2023-10-05 NOTE — PATIENT INSTRUCTIONS
Reminder: Please contact the Coumadin Clinic at [phone]  when you have medication changes. Examples, new medications, antibiotics, discontinued medications, new supplements, missed doses of warfarin or if you took extra doses of warfarin. This also includes OTC medications. Notifying us helps reduce the possibility of high and low INR's. In addition, if warfarin needs to be held for any procedures, please have surgeon or physician's office contact us before holding anticoagulant. Thanks, [facility] Cardiology Coumadin Clinic.

## 2023-10-19 ENCOUNTER — ANTI-COAG VISIT (OUTPATIENT)
Age: 63
End: 2023-10-19
Payer: MEDICAID

## 2023-10-19 DIAGNOSIS — Z95.2 AORTIC VALVE REPLACED: ICD-10-CM

## 2023-10-19 DIAGNOSIS — Z79.01 LONG TERM (CURRENT) USE OF ANTICOAGULANTS: Primary | ICD-10-CM

## 2023-10-19 LAB
POC INR: 2
PROTHROMBIN TIME, POC: NORMAL

## 2023-10-19 PROCEDURE — 93793 ANTICOAG MGMT PT WARFARIN: CPT | Performed by: INTERNAL MEDICINE

## 2023-10-19 PROCEDURE — 85610 PROTHROMBIN TIME: CPT | Performed by: INTERNAL MEDICINE

## 2023-10-19 NOTE — PATIENT INSTRUCTIONS
Reminder: Please contact the Coumadin Clinic at [phone]  when you have medication changes. Examples, new medications, antibiotics, discontinued medications, new supplements, missed doses of warfarin or if you took extra doses of warfarin. This also includes OTC medications. Notifying us helps reduce the possibility of high and low INR's. In addition, if warfarin needs to be held for any procedures, please have surgeon or physician's office contact us before holding anticoagulant. Thanks, [facility] Cardiology Coumadin Clinic.

## 2023-10-19 NOTE — PROGRESS NOTES
Continue current maintenance plan (see Anticoag Dosing Calendar). INR to be rechecked in 2 week(s). Warfarin tablet strength and weekly dosing schedule confirmed today. 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

## 2023-11-02 ENCOUNTER — ANTI-COAG VISIT (OUTPATIENT)
Age: 63
End: 2023-11-02

## 2023-11-02 DIAGNOSIS — Z95.2 AORTIC VALVE REPLACED: ICD-10-CM

## 2023-11-02 DIAGNOSIS — Z79.01 LONG TERM (CURRENT) USE OF ANTICOAGULANTS: Primary | ICD-10-CM

## 2023-11-02 LAB
POC INR: 3.4
PROTHROMBIN TIME, POC: NORMAL

## 2023-11-02 NOTE — PATIENT INSTRUCTIONS
Reminder: Please contact the Coumadin Clinic at [phone]  when you have medication changes. Examples, new medications, antibiotics, discontinued medications, new supplements, missed doses of warfarin or if you took extra doses of warfarin. This also includes OTC medications. Notifying us helps reduce the possibility of high and low INR's. In addition, if warfarin needs to be held for any procedures, please have surgeon or physician's office contact us before holding anticoagulant. Thanks, [facility] Cardiology Coumadin Clinic.

## 2023-11-17 ENCOUNTER — OFFICE VISIT (OUTPATIENT)
Age: 63
End: 2023-11-17

## 2023-11-17 ENCOUNTER — ANTI-COAG VISIT (OUTPATIENT)
Age: 63
End: 2023-11-17

## 2023-11-17 VITALS
DIASTOLIC BLOOD PRESSURE: 68 MMHG | HEIGHT: 60 IN | SYSTOLIC BLOOD PRESSURE: 102 MMHG | HEART RATE: 86 BPM | BODY MASS INDEX: 21.99 KG/M2 | WEIGHT: 112 LBS

## 2023-11-17 DIAGNOSIS — Z95.2 AORTIC VALVE REPLACED: ICD-10-CM

## 2023-11-17 DIAGNOSIS — Z95.2 AORTIC VALVE REPLACED: Primary | ICD-10-CM

## 2023-11-17 DIAGNOSIS — Z79.01 LONG TERM (CURRENT) USE OF ANTICOAGULANTS: ICD-10-CM

## 2023-11-17 LAB
POC INR: 2.4
PROTHROMBIN TIME, POC: NORMAL

## 2023-11-17 RX ORDER — BUPROPION HYDROCHLORIDE 150 MG/1
150 TABLET, EXTENDED RELEASE ORAL DAILY
Qty: 30 TABLET | Refills: 11 | COMMUNITY
Start: 2023-10-23 | End: 2024-10-22

## 2023-11-17 RX ORDER — BUSPIRONE HYDROCHLORIDE 5 MG/1
5 TABLET ORAL DAILY
COMMUNITY
Start: 2023-10-24

## 2023-11-17 ASSESSMENT — ENCOUNTER SYMPTOMS
HEMATEMESIS: 0
BLURRED VISION: 0
SHORTNESS OF BREATH: 0
SPUTUM PRODUCTION: 0
BOWEL INCONTINENCE: 0
HEMATOCHEZIA: 0
DIARRHEA: 0
ORTHOPNEA: 0
WHEEZING: 0
ABDOMINAL PAIN: 0
HOARSE VOICE: 0
COLOR CHANGE: 0

## 2023-11-17 NOTE — PATIENT INSTRUCTIONS
Reminder: Please contact the Coumadin Clinic at [phone]  when you have medication changes. Examples, new medications, antibiotics, discontinued medications, new supplements, missed doses of warfarin or if you took extra doses of warfarin. This also includes OTC medications. Notifying us helps reduce the possibility of high and low INR's. In addition, if warfarin needs to be held for any procedures, please have surgeon or physician's office contact us before holding anticoagulant. Thanks, [facility] Cardiology Coumadin Clinic.

## 2023-11-17 NOTE — PROGRESS NOTES
[address] [facility]  [address] [address]  PHONE: [phone]        23        NAME:  Jeff Kumar  : 1960  MRN: [id number]       SUBJECTIVE:   Jeff Kumar is a 70 y.o. female seen for a follow up visit regarding the following: COPD,AVR,TAA,T2DM,and HFpEF. She returns for scheduled up. Overall,she reports doing well. Chief Complaint   Patient presents with    Congestive Heart Failure       HPI:    Congestive Heart Failure  Presents for follow-up visit. Pertinent negatives include no abdominal pain, chest pain, chest pressure, claudication, edema, fatigue, muscle weakness, near-syncope, nocturia, orthopnea, palpitations, paroxysmal nocturnal dyspnea, shortness of breath or unexpected weight change. The symptoms have been stable. Past Medical History, Past Surgical History, Family history, Social History, and Medications were all reviewed with the patient today and updated as necessary.          Current Outpatient Medications:     buPROPion (WELLBUTRIN SR) 150 MG extended release tablet, Take 1 tablet by mouth daily, Disp: 30 tablet, Rfl: 11    busPIRone (BUSPAR) 5 MG tablet, Take 1 tablet by mouth daily, Disp: , Rfl:     warfarin (COUMADIN) 5 MG tablet, TAKE 1-2 TABLETS ONCE OR TWICE A WEEK OR AS DIRECTED BY [facility] CARDIOLOGY, Disp: 24 tablet, Rfl: 3    DULoxetine (CYMBALTA) 20 MG extended release capsule, Take 1 capsule by mouth daily, Disp: , Rfl:     warfarin (COUMADIN) 4 MG tablet, Take 1 tablet by mouth daily Take 1 tab everyday or as directed, Disp: 90 tablet, Rfl: 3    traZODone (DESYREL) 150 MG tablet, Take 1 tablet by mouth, Disp: , Rfl:     albuterol sulfate  (90 Base) MCG/ACT inhaler, Inhale into the lungs, Disp: , Rfl:     aspirin 81 MG EC tablet, Take by mouth daily, Disp: , Rfl:     budesonide-formoterol (SYMBICORT) 160-4.5 MCG/ACT AERO, Inhale 2 puffs into the lungs 2 times daily, Disp: , Rfl:     clonazePAM (KLONOPIN) 1

## 2023-12-01 ENCOUNTER — ANTI-COAG VISIT (OUTPATIENT)
Age: 63
End: 2023-12-01

## 2023-12-01 DIAGNOSIS — Z95.2 AORTIC VALVE REPLACED: ICD-10-CM

## 2023-12-01 DIAGNOSIS — Z79.01 LONG TERM (CURRENT) USE OF ANTICOAGULANTS: Primary | ICD-10-CM

## 2023-12-01 LAB
POC INR: 1.3
PROTHROMBIN TIME, POC: YES

## 2023-12-01 NOTE — PROGRESS NOTES
Anticoagulation Summary  As of 2023      INR goal:  1.5-2.5   TTR:  77.9 % (1.5 y)   INR used for dosin.3 (2023)   Warfarin maintenance plan:  4 mg (4 mg x 1) every day   Weekly warfarin total:  28 mg   Plan last modified:  Luzmaria Ayers MA (2023)   Next INR check:  2023   Target end date:   Indefinite    Indications    Long term (current) use of anticoagulants [Z79.01]  Aortic valve replaced [Z95.2]                 Anticoagulation Episode Summary       INR check location:  Anticoagulation Clinic    Preferred lab:      Send INR reminders to:  [facility] CARDIOLOGY TRINO PT    Comments:  **PT RANGE IS 1.8-2.5**          Anticoagulation Care Providers       Provider Role Specialty Phone number    Lauren Zabala MD [facility] Cardiology [phone]

## 2023-12-01 NOTE — PATIENT INSTRUCTIONS
Reminder: Please contact the Coumadin Clinic at [phone]  when you have medication changes. Examples, new medications, antibiotics, discontinued medications, new supplements, missed doses of warfarin or if you took extra doses of warfarin. This also includes OTC medications. Notifying us helps reduce the possibility of high and low INR's. In addition, if warfarin needs to be held for any procedures, please have surgeon or physician's office contact us before holding anticoagulant. Thanks, [facility] Cardiology Coumadin Clinic.

## 2023-12-08 ENCOUNTER — ANTI-COAG VISIT (OUTPATIENT)
Age: 63
End: 2023-12-08
Payer: MEDICAID

## 2023-12-08 DIAGNOSIS — Z95.2 AORTIC VALVE REPLACED: ICD-10-CM

## 2023-12-08 DIAGNOSIS — Z79.01 LONG TERM (CURRENT) USE OF ANTICOAGULANTS: Primary | ICD-10-CM

## 2023-12-08 LAB
POC INR: 1.6
PROTHROMBIN TIME, POC: NORMAL

## 2023-12-08 PROCEDURE — 85610 PROTHROMBIN TIME: CPT | Performed by: INTERNAL MEDICINE

## 2023-12-08 PROCEDURE — 93793 ANTICOAG MGMT PT WARFARIN: CPT | Performed by: INTERNAL MEDICINE

## 2023-12-08 NOTE — PATIENT INSTRUCTIONS
Reminder: Please contact the Coumadin Clinic at [phone]  when you have medication changes. Examples, new medications, antibiotics, discontinued medications, new supplements, missed doses of warfarin or if you took extra doses of warfarin. This also includes OTC medications. Notifying us helps reduce the possibility of high and low INR's. In addition, if warfarin needs to be held for any procedures, please have surgeon or physician's office contact us before holding anticoagulant. Thanks, [facility] Cardiology Coumadin Clinic.

## 2024-01-05 ENCOUNTER — ANTI-COAG VISIT (OUTPATIENT)
Age: 64
End: 2024-01-05

## 2024-01-05 DIAGNOSIS — Z95.2 AORTIC VALVE REPLACED: ICD-10-CM

## 2024-01-05 DIAGNOSIS — Z79.01 LONG TERM (CURRENT) USE OF ANTICOAGULANTS: Primary | ICD-10-CM

## 2024-01-05 LAB
POC INR: 1.4
PROTHROMBIN TIME, POC: NORMAL

## 2024-01-05 RX ORDER — WARFARIN SODIUM 5 MG/1
TABLET ORAL
Qty: 24 TABLET | Refills: 3 | Status: SHIPPED | OUTPATIENT
Start: 2024-01-05

## 2024-01-05 RX ORDER — WARFARIN SODIUM 4 MG/1
4 TABLET ORAL DAILY
Qty: 90 TABLET | Refills: 3 | Status: SHIPPED | OUTPATIENT
Start: 2024-01-05

## 2024-01-05 NOTE — PATIENT INSTRUCTIONS
Reminder: Please contact the Coumadin Clinic at [phone]  when you have medication changes. Examples, new medications, antibiotics, discontinued medications, new supplements, missed doses of warfarin or if you took extra doses of warfarin.  This also includes OTC medications. Notifying us helps reduce the possibility of high and low INR's. In addition, if warfarin needs to be held for any procedures, please have surgeon or physician's office contact us before holding anticoagulant. Thanks, [facility] Cardiology Coumadin Clinic.

## 2024-01-19 ENCOUNTER — ANTI-COAG VISIT (OUTPATIENT)
Age: 64
End: 2024-01-19

## 2024-01-19 DIAGNOSIS — Z79.01 LONG TERM (CURRENT) USE OF ANTICOAGULANTS: Primary | ICD-10-CM

## 2024-01-19 DIAGNOSIS — Z95.2 AORTIC VALVE REPLACED: ICD-10-CM

## 2024-01-19 LAB
POC INR: 1.6
PROTHROMBIN TIME, POC: NORMAL

## 2024-01-19 NOTE — PATIENT INSTRUCTIONS
Reminder: Please contact the Coumadin Clinic at [phone]  when you have medication changes. Examples, new medications, antibiotics, discontinued medications, new supplements, missed doses of warfarin or if you took extra doses of warfarin.  This also includes OTC medications. Notifying us helps reduce the possibility of high and low INR's. In addition, if warfarin needs to be held for any procedures, please have surgeon or physician's office contact us before holding anticoagulant. Thanks, [facility] Cardiology Coumadin Clinic.

## 2024-02-02 ENCOUNTER — ANTI-COAG VISIT (OUTPATIENT)
Age: 64
End: 2024-02-02

## 2024-02-02 DIAGNOSIS — Z95.2 AORTIC VALVE REPLACED: ICD-10-CM

## 2024-02-02 DIAGNOSIS — Z79.01 LONG TERM (CURRENT) USE OF ANTICOAGULANTS: Primary | ICD-10-CM

## 2024-02-02 LAB
POC INR: 2.2
PROTHROMBIN TIME, POC: NORMAL

## 2024-02-02 NOTE — PATIENT INSTRUCTIONS
Reminder: Please contact the Coumadin Clinic at [phone]  when you have medication changes. Examples, new medications, antibiotics, discontinued medications, new supplements, missed doses of warfarin or if you took extra doses of warfarin.  This also includes OTC medications. Notifying us helps reduce the possibility of high and low INR's. In addition, if warfarin needs to be held for any procedures, please have surgeon or physician's office contact us before holding anticoagulant. Thanks, [facility] Cardiology Coumadin Clinic.

## 2024-03-01 ENCOUNTER — ANTI-COAG VISIT (OUTPATIENT)
Age: 64
End: 2024-03-01

## 2024-03-01 DIAGNOSIS — Z79.01 LONG TERM (CURRENT) USE OF ANTICOAGULANTS: Primary | ICD-10-CM

## 2024-03-01 DIAGNOSIS — Z95.2 AORTIC VALVE REPLACED: ICD-10-CM

## 2024-03-01 LAB
POC INR: 2.3
PROTHROMBIN TIME, POC: NORMAL

## 2024-03-01 NOTE — PATIENT INSTRUCTIONS
Reminder: Please contact the Coumadin Clinic at [phone]  when you have medication changes. Examples, new medications, antibiotics, discontinued medications, new supplements, missed doses of warfarin or if you took extra doses of warfarin.  This also includes OTC medications. Notifying us helps reduce the possibility of high and low INR's. In addition, if warfarin needs to be held for any procedures, please have surgeon or physician's office contact us before holding anticoagulant. Thanks, [facility] Cardiology Coumadin Clinic.

## 2024-03-29 ENCOUNTER — ANTI-COAG VISIT (OUTPATIENT)
Age: 64
End: 2024-03-29
Payer: MEDICAID

## 2024-03-29 DIAGNOSIS — Z95.2 AORTIC VALVE REPLACED: ICD-10-CM

## 2024-03-29 DIAGNOSIS — Z79.01 LONG TERM (CURRENT) USE OF ANTICOAGULANTS: Primary | ICD-10-CM

## 2024-03-29 LAB
POC INR: 4.2
PROTHROMBIN TIME, POC: NORMAL

## 2024-03-29 PROCEDURE — 85610 PROTHROMBIN TIME: CPT | Performed by: INTERNAL MEDICINE

## 2024-03-29 PROCEDURE — 93793 ANTICOAG MGMT PT WARFARIN: CPT | Performed by: INTERNAL MEDICINE

## 2024-03-29 NOTE — PATIENT INSTRUCTIONS
Reminder: Please contact the Coumadin Clinic at [phone]  when you have medication changes. Examples, new medications, antibiotics, discontinued medications, new supplements, missed doses of warfarin or if you took extra doses of warfarin.  This also includes OTC medications. Notifying us helps reduce the possibility of high and low INR's. In addition, if warfarin needs to be held for any procedures, please have surgeon or physician's office contact us before holding anticoagulant. Thanks, [facility] Cardiology Coumadin Clinic.

## 2024-04-05 ENCOUNTER — ANTI-COAG VISIT (OUTPATIENT)
Age: 64
End: 2024-04-05

## 2024-04-05 DIAGNOSIS — Z79.01 LONG TERM (CURRENT) USE OF ANTICOAGULANTS: Primary | ICD-10-CM

## 2024-04-05 DIAGNOSIS — Z95.2 AORTIC VALVE REPLACED: ICD-10-CM

## 2024-04-05 LAB
POC INR: 2.2
PROTHROMBIN TIME, POC: NORMAL

## 2024-04-05 NOTE — PATIENT INSTRUCTIONS
Reminder: Please contact the Coumadin Clinic at [phone]  when you have medication changes. Examples, new medications, antibiotics, discontinued medications, new supplements, missed doses of warfarin or if you took extra doses of warfarin.  This also includes OTC medications. Notifying us helps reduce the possibility of high and low INR's. In addition, if warfarin needs to be held for any procedures, please have surgeon or physician's office contact us before holding anticoagulant. Thanks, [facility] Cardiology Coumadin Clinic.

## 2024-04-19 ENCOUNTER — ANTI-COAG VISIT (OUTPATIENT)
Age: 64
End: 2024-04-19

## 2024-04-19 DIAGNOSIS — Z95.2 AORTIC VALVE REPLACED: ICD-10-CM

## 2024-04-19 DIAGNOSIS — Z79.01 LONG TERM (CURRENT) USE OF ANTICOAGULANTS: Primary | ICD-10-CM

## 2024-04-19 LAB
POC INR: 2.7
PROTHROMBIN TIME, POC: NORMAL

## 2024-04-19 NOTE — PATIENT INSTRUCTIONS
Reminder: Please contact the Coumadin Clinic at [phone]  when you have medication changes. Examples, new medications, antibiotics, discontinued medications, new supplements, missed doses of warfarin or if you took extra doses of warfarin.  This also includes OTC medications. Notifying us helps reduce the possibility of high and low INR's. In addition, if warfarin needs to be held for any procedures, please have surgeon or physician's office contact us before holding anticoagulant. Thanks, [facility] Cardiology Coumadin Clinic.

## 2024-04-26 ENCOUNTER — ANTI-COAG VISIT (OUTPATIENT)
Age: 64
End: 2024-04-26
Payer: MEDICAID

## 2024-04-26 DIAGNOSIS — Z95.2 AORTIC VALVE REPLACED: ICD-10-CM

## 2024-04-26 DIAGNOSIS — Z79.01 LONG TERM (CURRENT) USE OF ANTICOAGULANTS: Primary | ICD-10-CM

## 2024-04-26 LAB
POC INR: 2.8
PROTHROMBIN TIME, POC: NORMAL

## 2024-04-26 PROCEDURE — 85610 PROTHROMBIN TIME: CPT | Performed by: INTERNAL MEDICINE

## 2024-04-26 PROCEDURE — 93793 ANTICOAG MGMT PT WARFARIN: CPT | Performed by: INTERNAL MEDICINE

## 2024-04-26 NOTE — PATIENT INSTRUCTIONS
Reminder: Please contact the Coumadin Clinic at [phone]  when you have medication changes. Examples, new medications, antibiotics, discontinued medications, new supplements, missed doses of warfarin or if you took extra doses of warfarin.  This also includes OTC medications. Notifying us helps reduce the possibility of high and low INR's. In addition, if warfarin needs to be held for any procedures, please have surgeon or physician's office contact us before holding anticoagulant. Thanks, [facility] Cardiology Coumadin Clinic.      caffeine

## 2024-05-08 ENCOUNTER — ANTI-COAG VISIT (OUTPATIENT)
Age: 64
End: 2024-05-08
Payer: MEDICAID

## 2024-05-08 DIAGNOSIS — Z95.2 AORTIC VALVE REPLACED: ICD-10-CM

## 2024-05-08 DIAGNOSIS — Z79.01 LONG TERM (CURRENT) USE OF ANTICOAGULANTS: Primary | ICD-10-CM

## 2024-05-08 LAB
POC INR: 2.1
PROTHROMBIN TIME, POC: NORMAL

## 2024-05-08 PROCEDURE — 93793 ANTICOAG MGMT PT WARFARIN: CPT | Performed by: INTERNAL MEDICINE

## 2024-05-08 PROCEDURE — 85610 PROTHROMBIN TIME: CPT | Performed by: INTERNAL MEDICINE

## 2024-05-08 NOTE — PATIENT INSTRUCTIONS
Reminder: Please contact the Coumadin Clinic at [phone]  when you have medication changes. Examples, new medications, antibiotics, discontinued medications, new supplements, missed doses of warfarin or if you took extra doses of warfarin.  This also includes OTC medications. Notifying us helps reduce the possibility of high and low INR's. In addition, if warfarin needs to be held for any procedures, please have surgeon or physician's office contact us before holding anticoagulant. Thanks, [facility] Cardiology Coumadin Clinic.

## 2024-05-23 ENCOUNTER — ANTI-COAG VISIT (OUTPATIENT)
Age: 64
End: 2024-05-23
Payer: MEDICAID

## 2024-05-23 ENCOUNTER — TELEPHONE (OUTPATIENT)
Age: 64
End: 2024-05-23

## 2024-05-23 ENCOUNTER — OFFICE VISIT (OUTPATIENT)
Age: 64
End: 2024-05-23
Payer: MEDICAID

## 2024-05-23 VITALS
HEART RATE: 87 BPM | BODY MASS INDEX: 21.05 KG/M2 | HEIGHT: 60 IN | WEIGHT: 107.2 LBS | SYSTOLIC BLOOD PRESSURE: 118 MMHG | DIASTOLIC BLOOD PRESSURE: 70 MMHG

## 2024-05-23 DIAGNOSIS — Z95.2 AORTIC VALVE REPLACED: ICD-10-CM

## 2024-05-23 DIAGNOSIS — I10 ESSENTIAL HYPERTENSION: ICD-10-CM

## 2024-05-23 DIAGNOSIS — I71.21 ANEURYSM OF ASCENDING AORTA WITHOUT RUPTURE (HCC): ICD-10-CM

## 2024-05-23 DIAGNOSIS — I35.0 NONRHEUMATIC AORTIC VALVE STENOSIS: Primary | ICD-10-CM

## 2024-05-23 DIAGNOSIS — Z79.01 LONG TERM (CURRENT) USE OF ANTICOAGULANTS: Primary | ICD-10-CM

## 2024-05-23 LAB
POC INR: 3.4
PROTHROMBIN TIME, POC: NORMAL

## 2024-05-23 PROCEDURE — 85610 PROTHROMBIN TIME: CPT | Performed by: INTERNAL MEDICINE

## 2024-05-23 PROCEDURE — 3074F SYST BP LT 130 MM HG: CPT | Performed by: INTERNAL MEDICINE

## 2024-05-23 PROCEDURE — 99214 OFFICE O/P EST MOD 30 MIN: CPT | Performed by: INTERNAL MEDICINE

## 2024-05-23 PROCEDURE — 93000 ELECTROCARDIOGRAM COMPLETE: CPT | Performed by: INTERNAL MEDICINE

## 2024-05-23 PROCEDURE — 3078F DIAST BP <80 MM HG: CPT | Performed by: INTERNAL MEDICINE

## 2024-05-23 PROCEDURE — 1123F ACP DISCUSS/DSCN MKR DOCD: CPT | Performed by: INTERNAL MEDICINE

## 2024-05-23 ASSESSMENT — ENCOUNTER SYMPTOMS
ORTHOPNEA: 0
SHORTNESS OF BREATH: 1
HEMATEMESIS: 0
WHEEZING: 0
BOWEL INCONTINENCE: 0
HEMATOCHEZIA: 0
COLOR CHANGE: 0
ABDOMINAL PAIN: 0
DIARRHEA: 0
HOARSE VOICE: 0
SPUTUM PRODUCTION: 0
BLURRED VISION: 0

## 2024-05-23 NOTE — PATIENT INSTRUCTIONS
Reminder: Please contact the [facility]madin Clinic at [phone]  when you have medication changes. Examples, new medications, antibiotics, discontinued medications, new supplements, missed doses of warfarin or if you took extra doses of warfarin.  This also includes OTC medications. Notifying us helps reduce the possibility of high and low INR's. In addition, if warfarin needs to be held for any procedures, please have surgeon or physician's office contact us before holding anticoagulant. Thanks, [facility] Cardiology Coumadin Clinic.

## 2024-05-23 NOTE — TELEPHONE ENCOUNTER
Patient called stating she has the following concerns :    Would like to gain weight  Needs to get OK from Dr Boyer   Guidelines on what to do    Please call and advise.

## 2024-05-23 NOTE — PROGRESS NOTES
[facility] CARDIOLOGY  [address] [place] [address], [address]  [place] [address]  PHONE: [phone]        24        NAME:  Loc Kumar  : 1960  MRN: [id number]       SUBJECTIVE:   Loc Kumar is a 63 y.o. female seen for a follow up visit regarding the following: COPD,SAVR,TAA,T2DM,and HFpEF.Recent echo reported normal LV EF,mild MS & MR,and normal functioning AVR.Chest CTA in 2024 reported TA at 4.9 cm which was unchanged from .She returns for follow up.She reports doing ok.I discussed test results with the patient.    Chief Complaint   Patient presents with    Nonrheumatic aortic valve stenosis       HPI:    Congestive Heart Failure  Presents for follow-up visit. Associated symptoms include fatigue and shortness of breath. Pertinent negatives include no abdominal pain, chest pain, chest pressure, claudication, edema, muscle weakness, near-syncope, nocturia, orthopnea, palpitations, paroxysmal nocturnal dyspnea or unexpected weight change.       Past Medical History, Past Surgical History, Family history, Social History, and Medications were all reviewed with the patient today and updated as necessary.         Current Outpatient Medications:     warfarin (COUMADIN) 5 MG tablet, TAKE 1-2 TABLETS ONCE OR TWICE A WEEK OR AS DIRECTED BY [facility] CARDIOLOGY, Disp: 24 tablet, Rfl: 3    warfarin (COUMADIN) 4 MG tablet, Take 1 tablet by mouth daily Take 1 tab everyday or as directed, Disp: 90 tablet, Rfl: 3    buPROPion (WELLBUTRIN SR) 150 MG extended release tablet, Take 1 tablet by mouth daily, Disp: 30 tablet, Rfl: 11    busPIRone (BUSPAR) 5 MG tablet, Take 1 tablet by mouth daily, Disp: , Rfl:     DULoxetine (CYMBALTA) 20 MG extended release capsule, Take 1 capsule by mouth daily, Disp: , Rfl:     traZODone (DESYREL) 150 MG tablet, Take 1 tablet by mouth, Disp: , Rfl:     albuterol sulfate  (90 Base) MCG/ACT inhaler, Inhale into the lungs, Disp: , Rfl:     aspirin 81 MG EC

## 2024-06-06 ENCOUNTER — ANTI-COAG VISIT (OUTPATIENT)
Age: 64
End: 2024-06-06

## 2024-06-06 DIAGNOSIS — Z95.2 AORTIC VALVE REPLACED: ICD-10-CM

## 2024-06-06 DIAGNOSIS — Z79.01 LONG TERM (CURRENT) USE OF ANTICOAGULANTS: Primary | ICD-10-CM

## 2024-06-06 LAB
POC INR: 2.1
PROTHROMBIN TIME, POC: NORMAL

## 2024-06-06 NOTE — PATIENT INSTRUCTIONS
Reminder: Please contact the Coumadin Clinic at [phone]  when you have medication changes. Examples, new medications, antibiotics, discontinued medications, new supplements, missed doses of warfarin or if you took extra doses of warfarin.  This also includes OTC medications. Notifying us helps reduce the possibility of high and low INR's. In addition, if warfarin needs to be held for any procedures, please have surgeon or physician's office contact us before holding anticoagulant. Thanks, [facility] Cardiology Coumadin Clinic.

## 2024-06-20 ENCOUNTER — ANTI-COAG VISIT (OUTPATIENT)
Age: 64
End: 2024-06-20

## 2024-06-20 DIAGNOSIS — Z79.01 LONG TERM (CURRENT) USE OF ANTICOAGULANTS: Primary | ICD-10-CM

## 2024-06-20 DIAGNOSIS — Z95.2 AORTIC VALVE REPLACED: ICD-10-CM

## 2024-06-20 LAB
POC INR: 1.6
PROTHROMBIN TIME, POC: NORMAL

## 2024-06-20 NOTE — PATIENT INSTRUCTIONS
Reminder: Please contact the Coumadin Clinic at [phone]  when you have medication changes. Examples, new medications, antibiotics, discontinued medications, new supplements, missed doses of warfarin or if you took extra doses of warfarin.  This also includes OTC medications. Notifying us helps reduce the possibility of high and low INR's. In addition, if warfarin needs to be held for any procedures, please have surgeon or physician's office contact us before holding anticoagulant. Thanks, [facility] Cardiology Coumadin Clinic.

## 2024-06-27 ENCOUNTER — ANTI-COAG VISIT (OUTPATIENT)
Age: 64
End: 2024-06-27
Payer: MEDICAID

## 2024-06-27 DIAGNOSIS — Z95.2 AORTIC VALVE REPLACED: ICD-10-CM

## 2024-06-27 DIAGNOSIS — Z79.01 LONG TERM (CURRENT) USE OF ANTICOAGULANTS: Primary | ICD-10-CM

## 2024-06-27 LAB
POC INR: 1.5
PROTHROMBIN TIME, POC: YES

## 2024-06-27 PROCEDURE — 85610 PROTHROMBIN TIME: CPT | Performed by: INTERNAL MEDICINE

## 2024-06-27 PROCEDURE — 93793 ANTICOAG MGMT PT WARFARIN: CPT | Performed by: INTERNAL MEDICINE

## 2024-06-27 NOTE — PATIENT INSTRUCTIONS
Reminder: Please contact the Coumadin Clinic at [phone]  when you have medication changes. Examples, new medications, antibiotics, discontinued medications, new supplements, missed doses of warfarin or if you took extra doses of warfarin.  This also includes OTC medications. Notifying us helps reduce the possibility of high and low INR's. In addition, if warfarin needs to be held for any procedures, please have surgeon or physician's office contact us before holding anticoagulant. Thanks, [facility] Cardiology Coumadin Clinic.

## 2024-06-27 NOTE — PROGRESS NOTES
Anticoagulation Summary  As of 2024      INR goal:  1.8-2.5   TTR:  70.4 % (2 y)   INR used for dosin.5 (2024)   Warfarin maintenance plan:  2 mg (4 mg x 0.5) every Thu; 4 mg (4 mg x 1) all other days   Weekly warfarin total:  26 mg   Plan last modified:  Yudy Angel MA (2024)   Next INR check:  7/3/2024   Target end date:  Indefinite    Indications    Long term (current) use of anticoagulants [Z79.01]  Aortic valve replaced [Z95.2]                 Anticoagulation Episode Summary       INR check location:  Anticoagulation Clinic    Preferred lab:      Send INR reminders to:  [facility]    Comments:  **PT RANGE IS 1.8-2.5**          Anticoagulation Care Providers       Provider Role Specialty Phone number    Randy Boyer MD [facility] Cardiology [phone]

## 2024-07-02 ENCOUNTER — TELEPHONE (OUTPATIENT)
Age: 64
End: 2024-07-02

## 2024-07-02 ENCOUNTER — ANTI-COAG VISIT (OUTPATIENT)
Age: 64
End: 2024-07-02
Payer: MEDICAID

## 2024-07-02 DIAGNOSIS — Z79.01 LONG TERM (CURRENT) USE OF ANTICOAGULANTS: Primary | ICD-10-CM

## 2024-07-02 DIAGNOSIS — Z95.2 AORTIC VALVE REPLACED: ICD-10-CM

## 2024-07-02 LAB
POC INR: 1.9
PROTHROMBIN TIME, POC: NORMAL

## 2024-07-02 PROCEDURE — 93793 ANTICOAG MGMT PT WARFARIN: CPT | Performed by: INTERNAL MEDICINE

## 2024-07-02 PROCEDURE — 85610 PROTHROMBIN TIME: CPT | Performed by: INTERNAL MEDICINE

## 2024-07-02 NOTE — TELEPHONE ENCOUNTER
Cardiac Pre-operative Assessment      Physician or Practice Requesting Medication Clearance or Risk Assessment: [facility]. Dr. Bar     : Carol Florez    Contact Phone Number: [phone]    Fax Number: [phone]    Date of Surgery/Procedure: 7/8/2024    Type of Surgery or Procedure: colonoscopy      Medications to hold: warfarin    # Days pre-procedure to hold: 5 day/Lovenox bridge        Pt states that she is having a colonoscopy, Pt has already received Lovenox from Carol Florez NP from [facility]. Her Lovenox instruction per Carol Florez NP (as told by pt) were:    7/4/24 start Lovenox, 50 mg 2 x daily,  and d/c  warfarin.     Pt will start warfarin after procedure.     Pt has 10 injections    Please advise

## 2024-07-02 NOTE — TELEPHONE ENCOUNTER
Agree with GI's  plan.CV staus is stable for colonoscopy.  Received: Today  Randy Boyer MD Mercorella, Krystal, MA  Caller: Unspecified (Today,  3:25 PM)               Called pt to relay Dr. Boyer's response. Pt stated understanding.

## 2024-07-02 NOTE — PATIENT INSTRUCTIONS
Reminder: Please contact the Coumadin Clinic at [phone]  when you have medication changes. Examples, new medications, antibiotics, discontinued medications, new supplements, missed doses of warfarin or if you took extra doses of warfarin.  This also includes OTC medications. Notifying us helps reduce the possibility of high and low INR's. In addition, if warfarin needs to be held for any procedures, please have surgeon or physician's office contact us before holding anticoagulant. Thanks, [facility] Cardiology Coumadin Clinic.

## 2024-07-12 ENCOUNTER — ANTI-COAG VISIT (OUTPATIENT)
Age: 64
End: 2024-07-12

## 2024-07-12 DIAGNOSIS — Z79.01 LONG TERM (CURRENT) USE OF ANTICOAGULANTS: Primary | ICD-10-CM

## 2024-07-12 DIAGNOSIS — Z95.2 AORTIC VALVE REPLACED: ICD-10-CM

## 2024-07-12 LAB
POC INR: 1.4
PROTHROMBIN TIME, POC: NORMAL

## 2024-07-12 NOTE — PATIENT INSTRUCTIONS
Reminder: Please contact the Coumadin Clinic at [phone]  when you have medication changes. Examples, new medications, antibiotics, discontinued medications, new supplements, missed doses of warfarin or if you took extra doses of warfarin.  This also includes OTC medications. Notifying us helps reduce the possibility of high and low INR's. In addition, if warfarin needs to be held for any procedures, please have surgeon or physician's office contact us before holding anticoagulant. Thanks, [facility] Cardiology Coumadin Clinic.

## 2024-07-19 ENCOUNTER — ANTI-COAG VISIT (OUTPATIENT)
Age: 64
End: 2024-07-19

## 2024-07-19 DIAGNOSIS — Z79.01 LONG TERM (CURRENT) USE OF ANTICOAGULANTS: Primary | ICD-10-CM

## 2024-07-19 DIAGNOSIS — Z95.2 AORTIC VALVE REPLACED: ICD-10-CM

## 2024-07-19 LAB
POC INR: 1.6
PROTHROMBIN TIME, POC: NORMAL

## 2024-07-19 NOTE — PATIENT INSTRUCTIONS
Reminder: Please contact the Coumadin Clinic at [phone]  when you have medication changes. Examples, new medications, antibiotics, discontinued medications, new supplements, missed doses of warfarin or if you took extra doses of warfarin.  This also includes OTC medications. Notifying us helps reduce the possibility of high and low INR's. In addition, if warfarin needs to be held for any procedures, please have surgeon or physician's office contact us before holding anticoagulant. Thanks, [facility] Cardiology Coumadin Clinic.

## 2024-07-19 NOTE — PROGRESS NOTES
Pt below range, she had a procedure done 2 weeks ago and had to hold her coumadin. One time dose of 6 mg tonight instead of 4 mg. Then continue current maintenance plan (see Anticoag Dosing Calendar). INR to be rechecked in 2 week(s).   Anticoagulation Episode Summary       Current INR goal:  1.8-2.5   TTR:  68.8 % (2.1 y)   Next INR check:  8/2/2024   INR from last check:  1.6 (7/19/2024)   Weekly max warfarin dose:     Target end date:  Indefinite   INR check location:  Anticoagulation Clinic   Preferred lab:     Send INR reminders to:  [facility] CARDIOLOGY [facility]    Indications    Long term (current) use of anticoagulants [Z79.01]  Aortic valve replaced [Z95.2]             Comments:  **PT RANGE IS 1.8-2.5**             Anticoagulation Care Providers       Provider Role Specialty Phone number    Randy Boyer MD [facility] Cardiology [phone]

## 2024-08-02 ENCOUNTER — ANTI-COAG VISIT (OUTPATIENT)
Age: 64
End: 2024-08-02

## 2024-08-02 DIAGNOSIS — Z95.2 AORTIC VALVE REPLACED: ICD-10-CM

## 2024-08-02 DIAGNOSIS — Z79.01 LONG TERM (CURRENT) USE OF ANTICOAGULANTS: Primary | ICD-10-CM

## 2024-08-02 LAB
POC INR: 1.6
PROTHROMBIN TIME, POC: NORMAL

## 2024-08-02 NOTE — PATIENT INSTRUCTIONS
Reminder: Please contact the Coumadin Clinic at [phone]  when you have medication changes. Examples, new medications, antibiotics, discontinued medications, new supplements, missed doses of warfarin or if you took extra doses of warfarin.  This also includes OTC medications. Notifying us helps reduce the possibility of high and low INR's. In addition, if warfarin needs to be held for any procedures, please have surgeon or physician's office contact us before holding anticoagulant. Thanks, [facility] Cardiology Coumadin Clinic.

## 2024-08-16 ENCOUNTER — ANTI-COAG VISIT (OUTPATIENT)
Age: 64
End: 2024-08-16

## 2024-08-16 DIAGNOSIS — Z95.2 AORTIC VALVE REPLACED: ICD-10-CM

## 2024-08-16 DIAGNOSIS — Z79.01 LONG TERM (CURRENT) USE OF ANTICOAGULANTS: Primary | ICD-10-CM

## 2024-08-16 LAB
POC INR: 2.1
PROTHROMBIN TIME, POC: NORMAL

## 2024-08-16 NOTE — PROGRESS NOTES
Warfarin tablet strength and weekly dosing schedule confirmed today.Therapeutic INR, patient to continue current maintenance plan (see Anticoag Dosing Calendar). INR to be rechecked in 2 week(s).   Anticoagulation Episode Summary       Current INR goal:  1.8-2.5   TTR:  67.5% (2.2 y)   Next INR check:  8/30/2024   INR from last check:  2.1 (8/16/2024)   Weekly max warfarin dose:  --   Target end date:  Indefinite   INR check location:  Anticoagulation Clinic   Preferred lab:  --   Send INR reminders to:  [facility] CARDIOLOGY [facility] PT    Indications    Long term (current) use of anticoagulants [Z79.01]  Aortic valve replaced [Z95.2]             Comments:  **PT RANGE IS 1.8-2.5**             Anticoagulation Care Providers       Provider Role Specialty Phone number    Randy Boyer MD [facility] Cardiology [phone]

## 2024-08-16 NOTE — PATIENT INSTRUCTIONS
Reminder: Please contact the Coumadin Clinic at [phone]  when you have medication changes. Examples, new medications, antibiotics, discontinued medications, new supplements, missed doses of warfarin or if you took extra doses of warfarin.  This also includes OTC medications. Notifying us helps reduce the possibility of high and low INR's. In addition, if warfarin needs to be held for any procedures, please have surgeon or physician's office contact us before holding anticoagulant. Thanks, [facility] Cardiology Coumadin Clinic.

## 2024-08-30 ENCOUNTER — ANTI-COAG VISIT (OUTPATIENT)
Age: 64
End: 2024-08-30

## 2024-08-30 DIAGNOSIS — Z79.01 LONG TERM (CURRENT) USE OF ANTICOAGULANTS: Primary | ICD-10-CM

## 2024-08-30 DIAGNOSIS — Z95.2 AORTIC VALVE REPLACED: ICD-10-CM

## 2024-08-30 LAB
POC INR: 1.9
PROTHROMBIN TIME, POC: NORMAL

## 2024-08-30 NOTE — PATIENT INSTRUCTIONS
Reminder: Please contact the Coumadin Clinic at [phone]  when you have medication changes. Examples, new medications, antibiotics, discontinued medications, new supplements, missed doses of warfarin or if you took extra doses of warfarin.  This also includes OTC medications. Notifying us helps reduce the possibility of high and low INR's. In addition, if warfarin needs to be held for any procedures, please have surgeon or physician's office contact us before holding anticoagulant. Thanks, [email or web address] Cardiology Coumadin Clinic.

## 2024-10-04 ENCOUNTER — ANTI-COAG VISIT (OUTPATIENT)
Age: 64
End: 2024-10-04

## 2024-10-04 DIAGNOSIS — Z95.2 AORTIC VALVE REPLACED: ICD-10-CM

## 2024-10-04 DIAGNOSIS — Z79.01 LONG TERM (CURRENT) USE OF ANTICOAGULANTS: Primary | ICD-10-CM

## 2024-10-04 LAB
POC INR: 1.8
PROTHROMBIN TIME, POC: NORMAL

## 2024-11-01 ENCOUNTER — ANTI-COAG VISIT (OUTPATIENT)
Age: 64
End: 2024-11-01

## 2024-11-01 DIAGNOSIS — Z79.01 LONG TERM (CURRENT) USE OF ANTICOAGULANTS: Primary | ICD-10-CM

## 2024-11-01 DIAGNOSIS — Z95.2 AORTIC VALVE REPLACED: ICD-10-CM

## 2024-11-01 LAB
POC INR: 2.6
PROTHROMBIN TIME, POC: NORMAL

## 2024-11-01 RX ORDER — WARFARIN SODIUM 4 MG/1
4 TABLET ORAL DAILY
Qty: 90 TABLET | Refills: 3 | Status: SHIPPED | OUTPATIENT
Start: 2024-11-01

## 2024-11-01 NOTE — PATIENT INSTRUCTIONS
Reminder: Please contact the Coumadin Clinic at [phone]  when you have medication changes. Examples, new medications, antibiotics, discontinued medications, new supplements, missed doses of warfarin or if you took extra doses of warfarin.  This also includes OTC medications. Notifying us helps reduce the possibility of high and low INR's. In addition, if warfarin needs to be held for any procedures, please have surgeon or physician's office contact us before holding anticoagulant. Thanks, [facility] Cardiology Coumadin Clinic.

## 2024-11-01 NOTE — TELEPHONE ENCOUNTER
Requested Prescriptions     Pending Prescriptions Disp Refills    warfarin (COUMADIN) 4 MG tablet 90 tablet 3     Sig: Take 1 tablet by mouth daily Take 1 tab everyday or as directed

## 2024-11-18 ENCOUNTER — OFFICE VISIT (OUTPATIENT)
Age: 64
End: 2024-11-18
Payer: MEDICAID

## 2024-11-18 VITALS
HEIGHT: 60 IN | SYSTOLIC BLOOD PRESSURE: 92 MMHG | DIASTOLIC BLOOD PRESSURE: 60 MMHG | HEART RATE: 86 BPM | WEIGHT: 111 LBS | BODY MASS INDEX: 21.79 KG/M2

## 2024-11-18 DIAGNOSIS — Z95.2 AORTIC VALVE REPLACED: ICD-10-CM

## 2024-11-18 DIAGNOSIS — I71.21 ANEURYSM OF ASCENDING AORTA WITHOUT RUPTURE (HCC): Primary | ICD-10-CM

## 2024-11-18 DIAGNOSIS — I10 ESSENTIAL HYPERTENSION: ICD-10-CM

## 2024-11-18 PROCEDURE — 99213 OFFICE O/P EST LOW 20 MIN: CPT | Performed by: INTERNAL MEDICINE

## 2024-11-18 PROCEDURE — 3074F SYST BP LT 130 MM HG: CPT | Performed by: INTERNAL MEDICINE

## 2024-11-18 PROCEDURE — 3078F DIAST BP <80 MM HG: CPT | Performed by: INTERNAL MEDICINE

## 2024-11-18 PROCEDURE — 1123F ACP DISCUSS/DSCN MKR DOCD: CPT | Performed by: INTERNAL MEDICINE

## 2024-11-18 RX ORDER — CETIRIZINE HYDROCHLORIDE 10 MG/1
10 TABLET ORAL DAILY
COMMUNITY
Start: 2024-06-28 | End: 2025-06-28

## 2024-11-18 ASSESSMENT — ENCOUNTER SYMPTOMS
COLOR CHANGE: 0
HEMATOCHEZIA: 0
BLURRED VISION: 0
ORTHOPNEA: 0
ABDOMINAL PAIN: 0
DIARRHEA: 0
SPUTUM PRODUCTION: 0
SHORTNESS OF BREATH: 0
HEMATEMESIS: 0
WHEEZING: 0
HOARSE VOICE: 0
BOWEL INCONTINENCE: 0

## 2024-11-18 NOTE — PROGRESS NOTES
[facility] CARDIOLOGY  [address] [place] [address], [facility] [address]  [place] [address]  PHONE: [phone]        24        NAME:  Loc Kumar  : 1960  MRN: [id number]       SUBJECTIVE:   Loc Kumar is a 64 y.o. female seen for a follow up visit regarding the following: The patient has a hx of COPD,SAVR,TAA,HFpEF,and T2DM.CTA in 2024 reported TAA was stable at 4.9 cm.She returns for scheduled follow up.Overall,she reports doing well.    Chief Complaint   Patient presents with    Congestive Heart Failure       HPI:    Hypertension  This is a chronic problem. The problem is unchanged. The problem is controlled. Pertinent negatives include no anxiety, blurred vision, chest pain, headaches, malaise/fatigue, neck pain, orthopnea, palpitations, peripheral edema, PND, shortness of breath or sweats.       Past Medical History, Past Surgical History, Family history, Social History, and Medications were all reviewed with the patient today and updated as necessary.         Current Outpatient Medications:     cetirizine (ZYRTEC) 10 MG tablet, Take 1 tablet by mouth daily, Disp: , Rfl:     warfarin (COUMADIN) 4 MG tablet, Take 1 tablet by mouth daily Take 1 tab everyday or as directed, Disp: 90 tablet, Rfl: 3    warfarin (COUMADIN) 5 MG tablet, TAKE 1-2 TABLETS ONCE OR TWICE A WEEK OR AS DIRECTED BY [facility] CARDIOLOGY, Disp: 24 tablet, Rfl: 3    buPROPion (WELLBUTRIN SR) 150 MG extended release tablet, Take 1 tablet by mouth daily, Disp: 30 tablet, Rfl: 11    busPIRone (BUSPAR) 5 MG tablet, Take 1 tablet by mouth daily, Disp: , Rfl:     DULoxetine (CYMBALTA) 20 MG extended release capsule, Take 1 capsule by mouth daily, Disp: , Rfl:     traZODone (DESYREL) 150 MG tablet, Take 1 tablet by mouth, Disp: , Rfl:     albuterol sulfate  (90 Base) MCG/ACT inhaler, Inhale into the lungs, Disp: , Rfl:     aspirin 81 MG EC tablet, Take by mouth daily, Disp: , Rfl:     budesonide-formoterol (SYMBICORT)

## 2024-12-02 ENCOUNTER — ANTI-COAG VISIT (OUTPATIENT)
Age: 64
End: 2024-12-02
Payer: MEDICAID

## 2024-12-02 DIAGNOSIS — Z95.2 AORTIC VALVE REPLACED: ICD-10-CM

## 2024-12-02 DIAGNOSIS — Z79.01 LONG TERM (CURRENT) USE OF ANTICOAGULANTS: Primary | ICD-10-CM

## 2024-12-02 LAB
POC INR: 2.6
PROTHROMBIN TIME, POC: NORMAL

## 2024-12-02 PROCEDURE — 93793 ANTICOAG MGMT PT WARFARIN: CPT | Performed by: INTERNAL MEDICINE

## 2024-12-02 PROCEDURE — 85610 PROTHROMBIN TIME: CPT | Performed by: INTERNAL MEDICINE

## 2024-12-02 NOTE — PATIENT INSTRUCTIONS
Reminder: Please contact the Coumadin Clinic at [phone]  when you have medication changes. Examples, new medications, antibiotics, discontinued medications, new supplements, missed doses of warfarin or if you took extra doses of warfarin.  This also includes OTC medications. Notifying us helps reduce the possibility of high and low INR's. In addition, if warfarin needs to be held for any procedures, please have surgeon or physician's office contact us before holding anticoagulant. Thanks, [facility] Cardiology Coumadin Clinic.

## 2024-12-16 ENCOUNTER — ANTI-COAG VISIT (OUTPATIENT)
Age: 64
End: 2024-12-16
Payer: MEDICAID

## 2024-12-16 DIAGNOSIS — Z95.2 AORTIC VALVE REPLACED: ICD-10-CM

## 2024-12-16 DIAGNOSIS — Z79.01 LONG TERM (CURRENT) USE OF ANTICOAGULANTS: Primary | ICD-10-CM

## 2024-12-16 LAB
POC INR: 2.5
PROTHROMBIN TIME, POC: NORMAL

## 2024-12-16 PROCEDURE — 93793 ANTICOAG MGMT PT WARFARIN: CPT | Performed by: INTERNAL MEDICINE

## 2024-12-16 PROCEDURE — 85610 PROTHROMBIN TIME: CPT | Performed by: INTERNAL MEDICINE

## 2024-12-16 NOTE — PATIENT INSTRUCTIONS
Reminder: Please contact the Coumadin Clinic at [phone]  when you have medication changes. Examples, new medications, antibiotics, discontinued medications, new supplements, missed doses of warfarin or if you took extra doses of warfarin.  This also includes OTC medications. Notifying us helps reduce the possibility of high and low INR's. In addition, if warfarin needs to be held for any procedures, please have surgeon or physician's office contact us before holding anticoagulant. Thanks, [facility] Cardiology Coumadin Clinic.

## 2024-12-30 ENCOUNTER — ANTI-COAG VISIT (OUTPATIENT)
Age: 64
End: 2024-12-30

## 2024-12-30 DIAGNOSIS — Z79.01 LONG TERM (CURRENT) USE OF ANTICOAGULANTS: Primary | ICD-10-CM

## 2024-12-30 DIAGNOSIS — Z95.2 AORTIC VALVE REPLACED: ICD-10-CM

## 2024-12-30 LAB
POC INR: 2.6
PROTHROMBIN TIME, POC: NORMAL

## 2024-12-30 NOTE — PATIENT INSTRUCTIONS
Reminder: Please contact the Coumadin Clinic at [phone]  when you have medication changes. Examples, new medications, antibiotics, discontinued medications, new supplements, missed doses of warfarin or if you took extra doses of warfarin.  This also includes OTC medications. Notifying us helps reduce the possibility of high and low INR's. In addition, if warfarin needs to be held for any procedures, please have surgeon or physician's office contact us before holding anticoagulant. Thanks, [facility] Cardiology Coumadin Clinic.

## 2025-01-13 ENCOUNTER — ANTI-COAG VISIT (OUTPATIENT)
Age: 65
End: 2025-01-13

## 2025-01-13 DIAGNOSIS — Z95.2 AORTIC VALVE REPLACED: ICD-10-CM

## 2025-01-13 DIAGNOSIS — Z79.01 LONG TERM (CURRENT) USE OF ANTICOAGULANTS: Primary | ICD-10-CM

## 2025-01-13 LAB
POC INR: 1.6
PROTHROMBIN TIME, POC: NORMAL

## 2025-01-13 NOTE — PATIENT INSTRUCTIONS
Reminder: Please contact the Coumadin Clinic at [phone]  when you have medication changes. Examples, new medications, antibiotics, discontinued medications, new supplements, missed doses of warfarin or if you took extra doses of warfarin.  This also includes OTC medications. Notifying us helps reduce the possibility of high and low INR's. In addition, if warfarin needs to be held for any procedures, please have surgeon or physician's office contact us before holding anticoagulant. Thanks, [facility] Cardiology Coumadin Clinic.

## 2025-01-27 ENCOUNTER — ANTI-COAG VISIT (OUTPATIENT)
Age: 65
End: 2025-01-27
Payer: MEDICAID

## 2025-01-27 DIAGNOSIS — Z79.01 LONG TERM (CURRENT) USE OF ANTICOAGULANTS: Primary | ICD-10-CM

## 2025-01-27 DIAGNOSIS — Z95.2 AORTIC VALVE REPLACED: ICD-10-CM

## 2025-01-27 LAB
POC INR: 2
PROTHROMBIN TIME, POC: NORMAL

## 2025-01-27 PROCEDURE — 93793 ANTICOAG MGMT PT WARFARIN: CPT | Performed by: INTERNAL MEDICINE

## 2025-01-27 PROCEDURE — 85610 PROTHROMBIN TIME: CPT | Performed by: INTERNAL MEDICINE

## 2025-01-27 NOTE — PATIENT INSTRUCTIONS
Reminder: Please contact the Coumadin Clinic at [phone]  when you have medication changes. Examples, new medications, antibiotics, discontinued medications, new supplements, missed doses of warfarin or if you took extra doses of warfarin.  This also includes OTC medications. Notifying us helps reduce the possibility of high and low INR's. In addition, if warfarin needs to be held for any procedures, please have surgeon or physician's office contact us before holding anticoagulant. Thanks, [facility] Cardiology Coumadin Clinic.

## 2025-02-10 ENCOUNTER — TELEPHONE (OUTPATIENT)
Age: 65
End: 2025-02-10

## 2025-02-10 NOTE — TELEPHONE ENCOUNTER
Pt called to r/s her inr check, pt is sick and has started amoxicillin last night until Saturday. I advised pt to take 2 mg QOD starting tonight, 4 mg AOD and recheck on 2/18.

## 2025-02-18 ENCOUNTER — ANTI-COAG VISIT (OUTPATIENT)
Age: 65
End: 2025-02-18
Payer: MEDICAID

## 2025-02-18 DIAGNOSIS — Z79.01 LONG TERM (CURRENT) USE OF ANTICOAGULANTS: Primary | ICD-10-CM

## 2025-02-18 DIAGNOSIS — Z95.2 AORTIC VALVE REPLACED: ICD-10-CM

## 2025-02-18 LAB
POC INR: 2.8
PROTHROMBIN TIME, POC: NORMAL

## 2025-02-18 PROCEDURE — 93793 ANTICOAG MGMT PT WARFARIN: CPT | Performed by: INTERNAL MEDICINE

## 2025-02-18 PROCEDURE — 85610 PROTHROMBIN TIME: CPT | Performed by: INTERNAL MEDICINE

## 2025-02-18 NOTE — PATIENT INSTRUCTIONS
Reminder: Please contact the Coumadin Clinic at [phone]  when you have medication changes. Examples, new medications, antibiotics, discontinued medications, new supplements, missed doses of warfarin or if you took extra doses of warfarin.  This also includes OTC medications. Notifying us helps reduce the possibility of high and low INR's. In addition, if warfarin needs to be held for any procedures, please have surgeon or physician's office contact us before holding anticoagulant. Thanks, [facility] Cardiology Coumadin Clinic.

## 2025-03-03 ENCOUNTER — ANTI-COAG VISIT (OUTPATIENT)
Age: 65
End: 2025-03-03
Payer: MEDICAID

## 2025-03-03 DIAGNOSIS — Z95.2 AORTIC VALVE REPLACED: ICD-10-CM

## 2025-03-03 DIAGNOSIS — Z79.01 LONG TERM (CURRENT) USE OF ANTICOAGULANTS: Primary | ICD-10-CM

## 2025-03-03 LAB
POC INR: 1.6
PROTHROMBIN TIME, POC: ABNORMAL

## 2025-03-03 PROCEDURE — 93793 ANTICOAG MGMT PT WARFARIN: CPT | Performed by: INTERNAL MEDICINE

## 2025-03-03 PROCEDURE — 85610 PROTHROMBIN TIME: CPT | Performed by: INTERNAL MEDICINE

## 2025-03-03 NOTE — PATIENT INSTRUCTIONS
Reminder: Please contact the Coumadin Clinic at [phone] when you have medication changes. Examples, new medications, antibiotics, discontinued medications, new supplements, missed doses of warfarin or if you took extra doses of warfarin.  This also includes OTC medications. Notifying us helps reduce the possibility of high and low INR's. In addition, if warfarin needs to be held for any procedures, please have surgeon or physician's office contact us before holding anticoagulant. Thanks, [facility] Cardiology Coumadin Clinic.       Please go to the Emergency Department if you experience:  - Extreme shortness of breath or chest pain  - Red, warm, painful, swollen limb  - Numbness or tingling on one side of the body  - Slurred speech or major vision change  - Extreme headache

## 2025-03-17 ENCOUNTER — ANTI-COAG VISIT (OUTPATIENT)
Age: 65
End: 2025-03-17

## 2025-03-17 DIAGNOSIS — Z79.01 LONG TERM (CURRENT) USE OF ANTICOAGULANTS: Primary | ICD-10-CM

## 2025-03-17 DIAGNOSIS — Z95.2 AORTIC VALVE REPLACED: ICD-10-CM

## 2025-03-17 LAB
POC INR: 2.1
PROTHROMBIN TIME, POC: NORMAL

## 2025-03-17 NOTE — PATIENT INSTRUCTIONS
Reminder: Please contact the Coumadin Clinic at [phone]  when you have medication changes. Examples, new medications, antibiotics, discontinued medications, new supplements, missed doses of warfarin or if you took extra doses of warfarin.  This also includes OTC medications. Notifying us helps reduce the possibility of high and low INR's. In addition, if warfarin needs to be held for any procedures, please have surgeon or physician's office contact us before holding anticoagulant. Thanks, [facility] Cardiology Coumadin Clinic.

## 2025-03-31 ENCOUNTER — ANTI-COAG VISIT (OUTPATIENT)
Age: 65
End: 2025-03-31
Payer: MEDICAID

## 2025-03-31 DIAGNOSIS — Z79.01 LONG TERM (CURRENT) USE OF ANTICOAGULANTS: Primary | ICD-10-CM

## 2025-03-31 DIAGNOSIS — Z95.2 AORTIC VALVE REPLACED: ICD-10-CM

## 2025-03-31 LAB
POC INR: 1.5
PROTHROMBIN TIME, POC: NORMAL

## 2025-03-31 PROCEDURE — 93793 ANTICOAG MGMT PT WARFARIN: CPT | Performed by: INTERNAL MEDICINE

## 2025-03-31 PROCEDURE — 85610 PROTHROMBIN TIME: CPT | Performed by: INTERNAL MEDICINE

## 2025-03-31 NOTE — PATIENT INSTRUCTIONS
Reminder: Please contact the Coumadin Clinic at [phone]  when you have medication changes. Examples, new medications, antibiotics, discontinued medications, new supplements, missed doses of warfarin or if you took extra doses of warfarin.  This also includes OTC medications. Notifying us helps reduce the possibility of high and low INR's. In addition, if warfarin needs to be held for any procedures, please have surgeon or physician's office contact us before holding anticoagulant. Thanks, [facility] Cardiology Coumadin Clinic.

## 2025-04-14 ENCOUNTER — ANTI-COAG VISIT (OUTPATIENT)
Age: 65
End: 2025-04-14
Payer: MEDICAID

## 2025-04-14 DIAGNOSIS — Z79.01 LONG TERM (CURRENT) USE OF ANTICOAGULANTS: Primary | ICD-10-CM

## 2025-04-14 DIAGNOSIS — Z95.2 AORTIC VALVE REPLACED: ICD-10-CM

## 2025-04-14 LAB
POC INR: 1.9
PROTHROMBIN TIME, POC: NORMAL

## 2025-04-14 PROCEDURE — 93793 ANTICOAG MGMT PT WARFARIN: CPT | Performed by: INTERNAL MEDICINE

## 2025-04-14 PROCEDURE — 85610 PROTHROMBIN TIME: CPT | Performed by: INTERNAL MEDICINE

## 2025-04-14 NOTE — PATIENT INSTRUCTIONS
Reminder: Please contact the Coumadin Clinic at [phone]  when you have medication changes. Examples, new medications, antibiotics, discontinued medications, new supplements, missed doses of warfarin or if you took extra doses of warfarin.  This also includes OTC medications. Notifying us helps reduce the possibility of high and low INR's. In addition, if warfarin needs to be held for any procedures, please have surgeon or physician's office contact us before holding anticoagulant. Thanks, [facility] Cardiology Coumadin Clinic.

## 2025-04-14 NOTE — PROGRESS NOTES
Pt is going to use estradiol, this increases that warfarin. She will take for 7 days. Will decrease weekly dose and recheck in 2 weeks//KM

## 2025-04-21 ENCOUNTER — ANTI-COAG VISIT (OUTPATIENT)
Age: 65
End: 2025-04-21
Payer: MEDICAID

## 2025-04-21 DIAGNOSIS — Z95.2 AORTIC VALVE REPLACED: ICD-10-CM

## 2025-04-21 DIAGNOSIS — Z79.01 LONG TERM (CURRENT) USE OF ANTICOAGULANTS: Primary | ICD-10-CM

## 2025-04-21 LAB — INR BLD: 1.1

## 2025-04-21 PROCEDURE — 93793 ANTICOAG MGMT PT WARFARIN: CPT | Performed by: INTERNAL MEDICINE

## 2025-04-21 NOTE — PROGRESS NOTES
Pt called stating that her INR was 1.1 yesterday, when she went to the hospital. They gave her Lovenox injections, then sent her home. Pt took 6 mg last night. Instructed pt to take 6 mg tonight. Pt will have INR checked tomorrow.

## 2025-04-22 ENCOUNTER — ANTI-COAG VISIT (OUTPATIENT)
Age: 65
End: 2025-04-22
Payer: MEDICAID

## 2025-04-22 DIAGNOSIS — Z79.01 LONG TERM (CURRENT) USE OF ANTICOAGULANTS: Primary | ICD-10-CM

## 2025-04-22 DIAGNOSIS — Z95.2 AORTIC VALVE REPLACED: ICD-10-CM

## 2025-04-22 LAB
POC INR: 1.6
PROTHROMBIN TIME, POC: NORMAL

## 2025-04-22 PROCEDURE — 93793 ANTICOAG MGMT PT WARFARIN: CPT | Performed by: INTERNAL MEDICINE

## 2025-04-22 PROCEDURE — 85610 PROTHROMBIN TIME: CPT | Performed by: INTERNAL MEDICINE

## 2025-04-22 NOTE — PATIENT INSTRUCTIONS
Reminder: Please contact the Coumadin Clinic at [phone]  when you have medication changes. Examples, new medications, antibiotics, discontinued medications, new supplements, missed doses of warfarin or if you took extra doses of warfarin.  This also includes OTC medications. Notifying us helps reduce the possibility of high and low INR's. In addition, if warfarin needs to be held for any procedures, please have surgeon or physician's office contact us before holding anticoagulant. Thanks, [facility] Cardiology Coumadin Clinic.

## 2025-04-28 ENCOUNTER — ANTI-COAG VISIT (OUTPATIENT)
Age: 65
End: 2025-04-28
Payer: MEDICAID

## 2025-04-28 DIAGNOSIS — Z95.2 AORTIC VALVE REPLACED: ICD-10-CM

## 2025-04-28 DIAGNOSIS — Z79.01 LONG TERM (CURRENT) USE OF ANTICOAGULANTS: Primary | ICD-10-CM

## 2025-04-28 LAB
POC INR: 1.6
PROTHROMBIN TIME, POC: NORMAL

## 2025-04-28 PROCEDURE — 85610 PROTHROMBIN TIME: CPT | Performed by: INTERNAL MEDICINE

## 2025-04-28 PROCEDURE — 93793 ANTICOAG MGMT PT WARFARIN: CPT | Performed by: INTERNAL MEDICINE

## 2025-04-28 NOTE — PATIENT INSTRUCTIONS
Reminder: Please contact the Coumadin Clinic at [phone]  when you have medication changes. Examples, new medications, antibiotics, discontinued medications, new supplements, missed doses of warfarin or if you took extra doses of warfarin.  This also includes OTC medications. Notifying us helps reduce the possibility of high and low INR's. In addition, if warfarin needs to be held for any procedures, please have surgeon or physician's office contact us before holding anticoagulant. Thanks, [facility] Cardiology Coumadin Clinic.

## 2025-05-06 ENCOUNTER — ANTI-COAG VISIT (OUTPATIENT)
Age: 65
End: 2025-05-06
Payer: MEDICAID

## 2025-05-06 DIAGNOSIS — Z79.01 LONG TERM (CURRENT) USE OF ANTICOAGULANTS: Primary | ICD-10-CM

## 2025-05-06 DIAGNOSIS — Z95.2 AORTIC VALVE REPLACED: ICD-10-CM

## 2025-05-06 LAB
POC INR: 1.7
PROTHROMBIN TIME, POC: ABNORMAL

## 2025-05-06 PROCEDURE — 85610 PROTHROMBIN TIME: CPT | Performed by: INTERNAL MEDICINE

## 2025-05-06 PROCEDURE — 93793 ANTICOAG MGMT PT WARFARIN: CPT | Performed by: INTERNAL MEDICINE

## 2025-05-06 NOTE — PROGRESS NOTES
Maintenance plan increased by 7.1 %, (see Anticoag Dosing Calendar). INR to be rechecked in 1 week.

## 2025-05-06 NOTE — PATIENT INSTRUCTIONS
Reminder: Please contact the Coumadin Clinic at [phone]  when you have medication changes. Examples, new medications, antibiotics, discontinued medications, new supplements, missed doses of warfarin or if you took extra doses of warfarin.  This also includes OTC medications. Notifying us helps reduce the possibility of high and low INR's. In addition, if warfarin needs to be held for any procedures, please have surgeon or physician's office contact us before holding anticoagulant. Thanks, [email or web address] Cardiology Coumadin Clinic.

## 2025-05-13 ENCOUNTER — ANTI-COAG VISIT (OUTPATIENT)
Age: 65
End: 2025-05-13
Payer: MEDICAID

## 2025-05-13 DIAGNOSIS — Z95.2 AORTIC VALVE REPLACED: ICD-10-CM

## 2025-05-13 DIAGNOSIS — Z79.01 LONG TERM (CURRENT) USE OF ANTICOAGULANTS: Primary | ICD-10-CM

## 2025-05-13 LAB
POC INR: 1.8
PROTHROMBIN TIME, POC: NORMAL

## 2025-05-13 PROCEDURE — 93793 ANTICOAG MGMT PT WARFARIN: CPT | Performed by: INTERNAL MEDICINE

## 2025-05-13 PROCEDURE — 85610 PROTHROMBIN TIME: CPT | Performed by: INTERNAL MEDICINE

## 2025-05-13 NOTE — PATIENT INSTRUCTIONS
Reminder: Please contact the Coumadin Clinic at [phone]  when you have medication changes. Examples, new medications, antibiotics, discontinued medications, new supplements, missed doses of warfarin or if you took extra doses of warfarin.  This also includes OTC medications. Notifying us helps reduce the possibility of high and low INR's. In addition, if warfarin needs to be held for any procedures, please have surgeon or physician's office contact us before holding anticoagulant. Thanks, [facility] Cardiology Coumadin [facility].

## 2025-05-19 ENCOUNTER — HOSPITAL ENCOUNTER (OUTPATIENT)
Dept: CT IMAGING | Age: 65
Discharge: HOME | End: 2025-05-21
Attending: INTERNAL MEDICINE
Payer: MEDICAID

## 2025-05-19 DIAGNOSIS — I71.21 ANEURYSM OF ASCENDING AORTA WITHOUT RUPTURE: ICD-10-CM

## 2025-05-19 LAB — CREAT BLD-MCNC: 0.88 MG/DL (ref 0.8–1.5)

## 2025-05-19 PROCEDURE — 71275 CT ANGIOGRAPHY CHEST: CPT

## 2025-05-19 PROCEDURE — 71275 CT ANGIOGRAPHY CHEST: CPT | Performed by: RADIOLOGY

## 2025-05-19 PROCEDURE — 6360000004 HC RX CONTRAST MEDICATION: Performed by: INTERNAL MEDICINE

## 2025-05-19 PROCEDURE — 82565 ASSAY OF CREATININE: CPT

## 2025-05-19 RX ORDER — IOPAMIDOL 755 MG/ML
75 INJECTION, SOLUTION INTRAVASCULAR
Status: COMPLETED | OUTPATIENT
Start: 2025-05-19 | End: 2025-05-19

## 2025-05-19 RX ADMIN — IOPAMIDOL 75 ML: 755 INJECTION, SOLUTION INTRAVENOUS at 16:12

## 2025-05-22 ENCOUNTER — ANTI-COAG VISIT (OUTPATIENT)
Age: 65
End: 2025-05-22

## 2025-05-22 ENCOUNTER — OFFICE VISIT (OUTPATIENT)
Age: 65
End: 2025-05-22
Payer: MEDICAID

## 2025-05-22 VITALS
DIASTOLIC BLOOD PRESSURE: 56 MMHG | SYSTOLIC BLOOD PRESSURE: 92 MMHG | WEIGHT: 107.4 LBS | HEART RATE: 62 BPM | BODY MASS INDEX: 20.98 KG/M2

## 2025-05-22 DIAGNOSIS — Z79.01 LONG TERM (CURRENT) USE OF ANTICOAGULANTS: Primary | ICD-10-CM

## 2025-05-22 DIAGNOSIS — Z95.2 AORTIC VALVE REPLACED: ICD-10-CM

## 2025-05-22 DIAGNOSIS — J44.9 CHRONIC OBSTRUCTIVE PULMONARY DISEASE, UNSPECIFIED COPD TYPE (HCC): ICD-10-CM

## 2025-05-22 DIAGNOSIS — I71.21 ANEURYSM OF ASCENDING AORTA WITHOUT RUPTURE: ICD-10-CM

## 2025-05-22 LAB
POC INR: 1.6
PROTHROMBIN TIME, POC: ABNORMAL

## 2025-05-22 PROCEDURE — 99213 OFFICE O/P EST LOW 20 MIN: CPT | Performed by: INTERNAL MEDICINE

## 2025-05-22 PROCEDURE — 3074F SYST BP LT 130 MM HG: CPT | Performed by: INTERNAL MEDICINE

## 2025-05-22 PROCEDURE — 93000 ELECTROCARDIOGRAM COMPLETE: CPT | Performed by: INTERNAL MEDICINE

## 2025-05-22 PROCEDURE — 1123F ACP DISCUSS/DSCN MKR DOCD: CPT | Performed by: INTERNAL MEDICINE

## 2025-05-22 PROCEDURE — 3078F DIAST BP <80 MM HG: CPT | Performed by: INTERNAL MEDICINE

## 2025-05-22 RX ORDER — PRUCALOPRIDE 2 MG/1
TABLET ORAL DAILY
COMMUNITY

## 2025-05-22 RX ORDER — ZOLEDRONIC ACID 0.05 MG/ML
5 INJECTION, SOLUTION INTRAVENOUS ONCE
COMMUNITY

## 2025-05-22 RX ORDER — MECLIZINE HYDROCHLORIDE 25 MG/1
25 TABLET ORAL 3 TIMES DAILY PRN
COMMUNITY

## 2025-05-22 RX ORDER — ESTRADIOL 0.1 MG/G
2 CREAM VAGINAL DAILY
COMMUNITY

## 2025-05-22 RX ORDER — CLOBETASOL PROPIONATE 0.5 MG/G
CREAM TOPICAL 2 TIMES DAILY
COMMUNITY

## 2025-05-22 RX ORDER — UMECLIDINIUM 62.5 UG/1
1 AEROSOL, POWDER ORAL DAILY
COMMUNITY

## 2025-05-22 RX ORDER — DUPILUMAB 300 MG/2ML
300 INJECTION, SOLUTION SUBCUTANEOUS ONCE
COMMUNITY

## 2025-05-22 RX ORDER — CALCITRIOL 0.25 UG/1
0.25 CAPSULE, LIQUID FILLED ORAL DAILY
COMMUNITY

## 2025-05-22 RX ORDER — ONDANSETRON 4 MG/1
4 TABLET, ORALLY DISINTEGRATING ORAL EVERY 8 HOURS PRN
COMMUNITY

## 2025-05-22 RX ORDER — GLIPIZIDE 5 MG/1
5 TABLET ORAL
COMMUNITY

## 2025-05-22 ASSESSMENT — ENCOUNTER SYMPTOMS
SORE THROAT: 0
VISUAL CHANGE: 0
COLOR CHANGE: 0
CHANGE IN BOWEL HABIT: 0
ORTHOPNEA: 0
NAUSEA: 0
SWOLLEN GLANDS: 0
HEMATEMESIS: 0
BLURRED VISION: 0
BOWEL INCONTINENCE: 0
SHORTNESS OF BREATH: 0
HEMATOCHEZIA: 0
COUGH: 0
WHEEZING: 0
VOMITING: 0
ABDOMINAL PAIN: 0
SPUTUM PRODUCTION: 0
HOARSE VOICE: 0
DIARRHEA: 0

## 2025-05-22 NOTE — PROGRESS NOTES
[facility] CARDIOLOGY  [address] [place] [address], [address]  [place] [address]  PHONE: [phone]        25        NAME:  Loc Kumar  : 1960  MRN: [id number]       SUBJECTIVE:   Loc Kumar is a 64 y.o. female seen for a follow up visit regarding the following: The patient has COPD, history of surgical aortic valve replacement, history of heart failure with preserved ejection fraction, type 2 diabetes, and thoracic aortic aneurysm.  Follow-up surveillance annual chest CT on 2025 reported a stable dilated ascending aorta with a maximum diameter of 4.5 cm.  Additional findings included chronic emphysema and interstitial calcified pulmonary disease.  The patient had an echo performed on 3/19/2025 at [facility] which reported normal left ventricular ejection fraction, normal diastolic function, normal aortic valve function with a 10 mm gradient.  There was a mean 3 mm gradient across the mitral valve with trace mitral regurgitation and mild aortic regurgitation she returns for scheduled follow-up.  I discussed recent chest CT findings with the patient.    Chief Complaint   Patient presents with    Hyperlipidemia       HPI:    Fatigue  This is a chronic problem. The problem occurs daily. The problem has been unchanged. Associated symptoms include fatigue. Pertinent negatives include no abdominal pain, anorexia, arthralgias, change in bowel habit, chest pain, chills, congestion, coughing, diaphoresis, fever, headaches, joint swelling, myalgias, nausea, neck pain, numbness, rash, sore throat, swollen glands, urinary symptoms, vertigo, visual change, vomiting or weakness.       Past Medical History, Past Surgical History, Family history, Social History, and Medications were all reviewed with the patient today and updated as necessary.         Current Outpatient Medications:     calcitRIOL (ROCALTROL) 0.25 MCG capsule, Take 1 capsule by mouth daily, Disp: , Rfl:     clobetasol

## 2025-05-22 NOTE — PROGRESS NOTES
Pt states she thought she was to take 1.5 on Tuesday 2 weeks ago, I clarified to her that she was supposed to permanently continue 1.5 every Tuesday. One time dose of 6 mg tonight instead of 4 mg. Then continue current maintenance plan (see Anticoag Dosing Calendar). INR to be rechecked in 2 week(s).   Anticoagulation Episode Summary       Current INR goal:  1.8-2.5   TTR:  62.2% (2.9 y)   Next INR check:  6/5/2025   INR from last check:  1.6 (5/22/2025)   Weekly max warfarin dose:  --   Target end date:  Indefinite   INR check location:  Anticoagulation Clinic   Preferred lab:  --   Send INR reminders to:  [facility] CARDIOLOGY [place] [facility]    Indications    Long term (current) use of anticoagulants [Z79.01]  Aortic valve replaced [Z95.2]             Comments:  **PT RANGE IS 1.8-2.5**             Anticoagulation Care Providers       Provider Role Specialty Phone number    Randy Boyer MD [facility] Cardiovascular Disease [phone]

## 2025-06-05 ENCOUNTER — ANTI-COAG VISIT (OUTPATIENT)
Age: 65
End: 2025-06-05
Payer: MEDICAID

## 2025-06-05 DIAGNOSIS — Z79.01 LONG TERM (CURRENT) USE OF ANTICOAGULANTS: Primary | ICD-10-CM

## 2025-06-05 DIAGNOSIS — Z95.2 AORTIC VALVE REPLACED: ICD-10-CM

## 2025-06-05 LAB
POC INR: 1.6
PROTHROMBIN TIME, POC: ABNORMAL

## 2025-06-05 PROCEDURE — 85610 PROTHROMBIN TIME: CPT | Performed by: INTERNAL MEDICINE

## 2025-06-05 PROCEDURE — 93793 ANTICOAG MGMT PT WARFARIN: CPT | Performed by: INTERNAL MEDICINE

## 2025-06-05 NOTE — PATIENT INSTRUCTIONS
Reminder: Please contact the Coumadin Clinic at [phone]  when you have medication changes. Examples, new medications, antibiotics, discontinued medications, new supplements, missed doses of warfarin or if you took extra doses of warfarin.  This also includes OTC medications. Notifying us helps reduce the possibility of high and low INR's. In addition, if warfarin needs to be held for any procedures, please have surgeon or physician's office contact us before holding anticoagulant. Thanks, [facility] Cardiology Coumadin Clinic.

## 2025-06-05 NOTE — PROGRESS NOTES
Pt has been consuming higher amounts of cabbage than usual. Maintenance plan increased by 6.7 %, (see Anticoag Dosing Calendar). INR to be rechecked in 2 weeks.  Anticoagulation Episode Summary       Current INR goal:  1.8-2.5   TTR:  61.4% (3 y)   Next INR check:  6/19/2025   INR from last check:  1.6 (6/5/2025)   Weekly max warfarin dose:  --   Target end date:  Indefinite   INR check location:  Anticoagulation Clinic   Preferred lab:  --   Send INR reminders to:  [facility] CARDIOLOG[facility] [place] [facility]    Indications    Long term (current) use of anticoagulants [Z79.01]  Aortic valve replaced [Z95.2]             Comments:  **PT RANGE IS 1.8-2.5**             Anticoagulation Care Providers       Provider Role Specialty Phone number    Randy Boyer MD [facility] Cardiovascular Disease [phone]

## 2025-06-09 ENCOUNTER — ANTI-COAG VISIT (OUTPATIENT)
Age: 65
End: 2025-06-09

## 2025-06-09 DIAGNOSIS — Z79.01 LONG TERM (CURRENT) USE OF ANTICOAGULANTS: Primary | ICD-10-CM

## 2025-06-09 DIAGNOSIS — Z95.2 AORTIC VALVE REPLACED: ICD-10-CM

## 2025-06-09 NOTE — PROGRESS NOTES
Airway  Urgency: elective    Airway not difficult    General Information and Staff    Patient location during procedure: OR  CRNA: Cesar Pedroza CRNA    Indications and Patient Condition  Indications for airway management: airway protection    Preoxygenated: yes  Mask difficulty assessment: 0 - not attempted    Final Airway Details  Final airway type: supraglottic airway      Successful airway: I-gel  Size 5  Airway Seal Pressure (cm H2O): 20    Number of attempts at approach: 1               Pt called to report new medication. She has started amoxicillan for 5 days staring this morning. I decreased weekly dose and recheck on monday

## 2025-06-16 ENCOUNTER — ANTI-COAG VISIT (OUTPATIENT)
Age: 65
End: 2025-06-16
Payer: MEDICAID

## 2025-06-16 DIAGNOSIS — Z95.2 AORTIC VALVE REPLACED: ICD-10-CM

## 2025-06-16 DIAGNOSIS — Z79.01 LONG TERM (CURRENT) USE OF ANTICOAGULANTS: Primary | ICD-10-CM

## 2025-06-16 LAB
POC INR: 1.5
PROTHROMBIN TIME, POC: NORMAL

## 2025-06-16 PROCEDURE — 85610 PROTHROMBIN TIME: CPT | Performed by: INTERNAL MEDICINE

## 2025-06-16 PROCEDURE — 93793 ANTICOAG MGMT PT WARFARIN: CPT | Performed by: INTERNAL MEDICINE

## 2025-06-16 NOTE — PATIENT INSTRUCTIONS
Reminder: Please contact the Coumadin Clinic at [phone]  when you have medication changes. Examples, new medications, antibiotics, discontinued medications, new supplements, missed doses of warfarin or if you took extra doses of warfarin.  This also includes OTC medications. Notifying us helps reduce the possibility of high and low INR's. In addition, if warfarin needs to be held for any procedures, please have surgeon or physician's office contact us before holding anticoagulant. Thanks, [facility] Cardiology Coumadin Clinic.

## 2025-06-16 NOTE — PROGRESS NOTES
Pt is below range, pt states that she just finished antibiotics. Will increase and recheck in 2 weeks//KM

## 2025-06-30 ENCOUNTER — ANTI-COAG VISIT (OUTPATIENT)
Age: 65
End: 2025-06-30
Payer: MEDICAID

## 2025-06-30 DIAGNOSIS — Z79.01 LONG TERM (CURRENT) USE OF ANTICOAGULANTS: Primary | ICD-10-CM

## 2025-06-30 DIAGNOSIS — Z95.2 AORTIC VALVE REPLACED: ICD-10-CM

## 2025-06-30 LAB
POC INR: 1.7
PROTHROMBIN TIME, POC: ABNORMAL

## 2025-06-30 PROCEDURE — 85610 PROTHROMBIN TIME: CPT | Performed by: INTERNAL MEDICINE

## 2025-06-30 PROCEDURE — 93793 ANTICOAG MGMT PT WARFARIN: CPT | Performed by: INTERNAL MEDICINE

## 2025-06-30 NOTE — PATIENT INSTRUCTIONS
Reminder: Please contact the Coumadin Clinic at [phone]  when you have medication changes. Examples, new medications, antibiotics, discontinued medications, new supplements, missed doses of warfarin or if you took extra doses of warfarin.  This also includes OTC medications. Notifying us helps reduce the possibility of high and low INR's. In addition, if warfarin needs to be held for any procedures, please have surgeon or physician's office contact us before holding anticoagulant. Thanks, [facility] Cardiology Coumadin Clinic.

## 2025-06-30 NOTE — PROGRESS NOTES
One time dose of 8 mg tonight instead of 6 mg. Then continue current maintenance plan (see Anticoag Dosing Calendar). INR to be rechecked in 2 week(s).   Anticoagulation Episode Summary       Current INR goal:  1.8-2.5   TTR:  60.0% (3.1 y)   Next INR check:  7/15/2025   INR from last check:  1.7 (6/30/2025)   Weekly max warfarin dose:  --   Target end date:  Indefinite   INR check location:  Anticoagulation Clinic   Preferred lab:  --   Send INR reminders to:  [facility] CARD[facility]OLOG[facility] [place] [facility]    Indications    Long term (current) use of anticoagulants [Z79.01]  Aortic valve replaced [Z95.2]             Comments:  **PT RANGE IS 1.8-2.5**             Anticoagulation Care Providers       Provider Role Specialty Phone number    Randy Boyer MD [facility] Cardiovascular Disease [phone]

## 2025-07-14 ENCOUNTER — ANTI-COAG VISIT (OUTPATIENT)
Age: 65
End: 2025-07-14
Payer: MEDICAID

## 2025-07-14 DIAGNOSIS — Z95.2 AORTIC VALVE REPLACED: ICD-10-CM

## 2025-07-14 DIAGNOSIS — Z79.01 LONG TERM (CURRENT) USE OF ANTICOAGULANTS: Primary | ICD-10-CM

## 2025-07-14 LAB
POC INR: 1.4
PROTHROMBIN TIME, POC: ABNORMAL

## 2025-07-14 PROCEDURE — 93793 ANTICOAG MGMT PT WARFARIN: CPT | Performed by: INTERNAL MEDICINE

## 2025-07-14 PROCEDURE — 85610 PROTHROMBIN TIME: CPT | Performed by: INTERNAL MEDICINE

## 2025-07-14 NOTE — PATIENT INSTRUCTIONS
Reminder: Please contact the Coumadin Clinic at [phone]  when you have medication changes. Examples, new medications, antibiotics, discontinued medications, new supplements, missed doses of warfarin or if you took extra doses of warfarin.  This also includes OTC medications. Notifying us helps reduce the possibility of high and low INR's. In addition, if warfarin needs to be held for any procedures, please have surgeon or physician's office contact us before holding anticoagulant. Thanks, [facility] Cardiology Coumadin Clinic.

## 2025-07-14 NOTE — PROGRESS NOTES
Patient was taking AZO and was instructed by us to reduce weekly dose till she came in. She is below range today, will increase and recheck in 2 weeks.  Anticoagulation Episode Summary       Current INR goal:  1.8-2.5   TTR:  59.3% (3.1 y)   Next INR check:  7/28/2025   INR from last check:  1.4 (7/14/2025)   Weekly max warfarin dose:  --   Target end date:  Indefinite   INR check location:  Anticoagulation Clinic   Preferred lab:  --   Send INR reminders to:  [facility] CARDIOLOGY [place] [facility]    Indications    Long term (current) use of anticoagulants [Z79.01]  Aortic valve replaced [Z95.2]             Comments:  **PT RANGE IS 1.8-2.5**             Anticoagulation Care Providers       Provider Role Specialty Phone number    Randy Boyer MD Responsible Cardiovascular Disease [phone]

## 2025-07-28 ENCOUNTER — ANTI-COAG VISIT (OUTPATIENT)
Age: 65
End: 2025-07-28
Payer: MEDICAID

## 2025-07-28 DIAGNOSIS — Z79.01 LONG TERM (CURRENT) USE OF ANTICOAGULANTS: Primary | ICD-10-CM

## 2025-07-28 DIAGNOSIS — Z95.2 AORTIC VALVE REPLACED: ICD-10-CM

## 2025-07-28 LAB
POC INR: 4.8
PROTHROMBIN TIME, POC: NORMAL

## 2025-07-28 PROCEDURE — 93793 ANTICOAG MGMT PT WARFARIN: CPT | Performed by: INTERNAL MEDICINE

## 2025-07-28 PROCEDURE — 85610 PROTHROMBIN TIME: CPT | Performed by: INTERNAL MEDICINE

## 2025-07-28 NOTE — PATIENT INSTRUCTIONS
Reminder: Please contact the Coumadin Clinic at [phone]  when you have medication changes. Examples, new medications, antibiotics, discontinued medications, new supplements, missed doses of warfarin or if you took extra doses of warfarin.  This also includes OTC medications. Notifying us helps reduce the possibility of high and low INR's. In addition, if warfarin needs to be held for any procedures, please have surgeon or physician's office contact us before holding anticoagulant. Thanks, [facility] Cardiology Coumadin Clinic.

## 2025-07-28 NOTE — PROGRESS NOTES
Pt is above range, this is likely due to taking diflucan. Pt will start Estrace tonight. Will decrease and recheck on Friday

## 2025-08-01 ENCOUNTER — ANTI-COAG VISIT (OUTPATIENT)
Age: 65
End: 2025-08-01
Payer: MEDICAID

## 2025-08-01 DIAGNOSIS — Z95.2 AORTIC VALVE REPLACED: ICD-10-CM

## 2025-08-01 DIAGNOSIS — Z79.01 LONG TERM (CURRENT) USE OF ANTICOAGULANTS: Primary | ICD-10-CM

## 2025-08-01 LAB
POC INR: 1.7
PROTHROMBIN TIME, POC: NORMAL

## 2025-08-01 PROCEDURE — 93793 ANTICOAG MGMT PT WARFARIN: CPT | Performed by: INTERNAL MEDICINE

## 2025-08-01 PROCEDURE — 85610 PROTHROMBIN TIME: CPT | Performed by: INTERNAL MEDICINE

## 2025-08-01 RX ORDER — WARFARIN SODIUM 4 MG/1
4 TABLET ORAL DAILY
Qty: 100 TABLET | Refills: 3 | Status: SHIPPED | OUTPATIENT
Start: 2025-08-01

## 2025-08-01 NOTE — TELEPHONE ENCOUNTER
Requested Prescriptions     Pending Prescriptions Disp Refills    warfarin (COUMADIN) 4 MG tablet 100 tablet 3     Sig: Take 1 tablet by mouth daily Take 1 tab everyday or as directed     Verified rx in last OV date 8/1/25. Pharmacy confirmed. Erx as requested.

## 2025-08-01 NOTE — PATIENT INSTRUCTIONS
Reminder: Please contact the Coumadin Clinic at [phone]  when you have medication changes. Examples, new medications, antibiotics, discontinued medications, new supplements, missed doses of warfarin or if you took extra doses of warfarin.  This also includes OTC medications. Notifying us helps reduce the possibility of high and low INR's. In addition, if warfarin needs to be held for any procedures, please have surgeon or physician's office contact us before holding anticoagulant. Thanks, [facility] Cardiology Coumadin Clinic.

## 2025-08-15 ENCOUNTER — ANTI-COAG VISIT (OUTPATIENT)
Age: 65
End: 2025-08-15
Payer: MEDICAID

## 2025-08-15 DIAGNOSIS — Z79.01 LONG TERM (CURRENT) USE OF ANTICOAGULANTS: Primary | ICD-10-CM

## 2025-08-15 DIAGNOSIS — Z95.2 AORTIC VALVE REPLACED: ICD-10-CM

## 2025-08-15 LAB
POC INR: 1.7
PROTHROMBIN TIME, POC: ABNORMAL

## 2025-08-15 PROCEDURE — 85610 PROTHROMBIN TIME: CPT | Performed by: INTERNAL MEDICINE

## 2025-08-15 PROCEDURE — 93793 ANTICOAG MGMT PT WARFARIN: CPT | Performed by: INTERNAL MEDICINE

## 2025-08-16 ENCOUNTER — HOSPITAL ENCOUNTER (EMERGENCY)
Age: 65
Discharge: HOME | End: 2025-08-16
Attending: EMERGENCY MEDICINE
Payer: MEDICAID

## 2025-08-16 ENCOUNTER — APPOINTMENT (OUTPATIENT)
Dept: GENERAL RADIOLOGY | Age: 65
End: 2025-08-16
Payer: MEDICAID

## 2025-08-16 VITALS
HEART RATE: 61 BPM | SYSTOLIC BLOOD PRESSURE: 100 MMHG | RESPIRATION RATE: 17 BRPM | BODY MASS INDEX: 21.99 KG/M2 | TEMPERATURE: 98.3 F | HEIGHT: 60 IN | WEIGHT: 112 LBS | OXYGEN SATURATION: 97 % | DIASTOLIC BLOOD PRESSURE: 56 MMHG

## 2025-08-16 DIAGNOSIS — R07.89 ATYPICAL CHEST PAIN: ICD-10-CM

## 2025-08-16 DIAGNOSIS — Z87.09 HISTORY OF COPD: ICD-10-CM

## 2025-08-16 DIAGNOSIS — R42 ORTHOSTATIC LIGHTHEADEDNESS: Primary | ICD-10-CM

## 2025-08-16 LAB
ALBUMIN SERPL-MCNC: 3.7 G/DL (ref 3.2–4.6)
ALBUMIN/GLOB SERPL: 1.1 (ref 1–1.9)
ALP SERPL-CCNC: 62 U/L (ref 35–104)
ALT SERPL-CCNC: 18 U/L (ref 8–45)
ANION GAP SERPL CALC-SCNC: 13 MMOL/L (ref 7–16)
APPEARANCE UR: CLEAR
AST SERPL-CCNC: 21 U/L (ref 15–37)
BACTERIA URNS QL MICRO: 0 /HPF
BASOPHILS # BLD: 0.07 K/UL (ref 0–0.2)
BASOPHILS NFR BLD: 1.3 % (ref 0–2)
BILIRUB SERPL-MCNC: 0.3 MG/DL (ref 0–1.2)
BILIRUB UR QL: ABNORMAL
BUN SERPL-MCNC: 30 MG/DL (ref 8–23)
CALCIUM SERPL-MCNC: 9.9 MG/DL (ref 8.8–10.2)
CHLORIDE SERPL-SCNC: 101 MMOL/L (ref 98–107)
CO2 SERPL-SCNC: 27 MMOL/L (ref 20–29)
COLOR UR: ABNORMAL
CREAT SERPL-MCNC: 0.93 MG/DL (ref 0.6–1.1)
DIFFERENTIAL METHOD BLD: ABNORMAL
EKG ATRIAL RATE: 75 BPM
EKG DIAGNOSIS: NORMAL
EKG P AXIS: 99 DEGREES
EKG P-R INTERVAL: 150 MS
EKG Q-T INTERVAL: 406 MS
EKG QRS DURATION: 76 MS
EKG QTC CALCULATION (BAZETT): 453 MS
EKG R AXIS: 25 DEGREES
EKG T AXIS: 81 DEGREES
EKG VENTRICULAR RATE: 75 BPM
EOSINOPHIL # BLD: 0.6 K/UL (ref 0–0.8)
EOSINOPHIL NFR BLD: 11.5 % (ref 0.5–7.8)
EPI CELLS #/AREA URNS HPF: NORMAL /HPF
ERYTHROCYTE [DISTWIDTH] IN BLOOD BY AUTOMATED COUNT: 16.2 % (ref 11.9–14.6)
GLOBULIN SER CALC-MCNC: 3.3 G/DL (ref 2.3–3.5)
GLUCOSE SERPL-MCNC: 149 MG/DL (ref 70–99)
GLUCOSE UR STRIP.AUTO-MCNC: ABNORMAL MG/DL
HCT VFR BLD AUTO: 37 % (ref 35.8–46.3)
HGB BLD-MCNC: 12.2 G/DL (ref 11.7–15.4)
HGB UR QL STRIP: ABNORMAL
IMM GRANULOCYTES # BLD AUTO: 0.02 K/UL (ref 0–0.5)
IMM GRANULOCYTES NFR BLD AUTO: 0.4 % (ref 0–5)
INR PPP: 1.6
KETONES UR QL STRIP.AUTO: ABNORMAL MG/DL
LEUKOCYTE ESTERASE UR QL STRIP.AUTO: ABNORMAL
LYMPHOCYTES # BLD: 1.75 K/UL (ref 0.5–4.6)
LYMPHOCYTES NFR BLD: 33.7 % (ref 13–44)
MCH RBC QN AUTO: 28.6 PG (ref 26.1–32.9)
MCHC RBC AUTO-ENTMCNC: 33 G/DL (ref 31.4–35)
MCV RBC AUTO: 86.7 FL (ref 82–102)
MONOCYTES # BLD: 0.31 K/UL (ref 0.1–1.3)
MONOCYTES NFR BLD: 6 % (ref 4–12)
MUCOUS THREADS URNS QL MICRO: 0 /LPF
NEUTS SEG # BLD: 2.45 K/UL (ref 1.7–8.2)
NEUTS SEG NFR BLD: 47.1 % (ref 43–78)
NITRITE UR QL STRIP.AUTO: ABNORMAL
NRBC # BLD: 0 K/UL (ref 0–0.2)
OTHER OBSERVATIONS: NORMAL
PH UR STRIP: 5 (ref 5–9)
PLATELET # BLD AUTO: 236 K/UL (ref 150–450)
PMV BLD AUTO: 8.7 FL (ref 9.4–12.3)
POTASSIUM SERPL-SCNC: 4.3 MMOL/L (ref 3.5–5.1)
PROT SERPL-MCNC: 7 G/DL (ref 6.3–8.2)
PROT UR STRIP-MCNC: ABNORMAL MG/DL
PROTHROMBIN TIME: 18.6 SEC (ref 11.3–14.9)
RBC # BLD AUTO: 4.27 M/UL (ref 4.05–5.2)
RBC #/AREA URNS HPF: NORMAL /HPF
SODIUM SERPL-SCNC: 141 MMOL/L (ref 136–145)
SP GR UR REFRACTOMETRY: 1.03 (ref 1–1.02)
TROPONIN T SERPL HS-MCNC: 22.7 NG/L (ref 0–14)
TROPONIN T SERPL HS-MCNC: 24.1 NG/L (ref 0–14)
UROBILINOGEN UR QL STRIP.AUTO: ABNORMAL EU/DL (ref 0.2–1)
WBC # BLD AUTO: 5.2 K/UL (ref 4.3–11.1)
WBC URNS QL MICRO: NORMAL /HPF

## 2025-08-16 PROCEDURE — 99285 EMERGENCY DEPT VISIT HI MDM: CPT

## 2025-08-16 PROCEDURE — 85610 PROTHROMBIN TIME: CPT

## 2025-08-16 PROCEDURE — 84484 ASSAY OF TROPONIN QUANT: CPT

## 2025-08-16 PROCEDURE — 96360 HYDRATION IV INFUSION INIT: CPT

## 2025-08-16 PROCEDURE — 94760 N-INVAS EAR/PLS OXIMETRY 1: CPT

## 2025-08-16 PROCEDURE — 80053 COMPREHEN METABOLIC PANEL: CPT

## 2025-08-16 PROCEDURE — 85025 COMPLETE CBC W/AUTO DIFF WBC: CPT

## 2025-08-16 PROCEDURE — 71045 X-RAY EXAM CHEST 1 VIEW: CPT

## 2025-08-16 PROCEDURE — 93010 ELECTROCARDIOGRAM REPORT: CPT | Performed by: INTERNAL MEDICINE

## 2025-08-16 PROCEDURE — 2580000003 HC RX 258: Performed by: EMERGENCY MEDICINE

## 2025-08-16 PROCEDURE — 6370000000 HC RX 637 (ALT 250 FOR IP): Performed by: EMERGENCY MEDICINE

## 2025-08-16 PROCEDURE — 81001 URINALYSIS AUTO W/SCOPE: CPT

## 2025-08-16 PROCEDURE — 93005 ELECTROCARDIOGRAM TRACING: CPT | Performed by: EMERGENCY MEDICINE

## 2025-08-16 PROCEDURE — 94640 AIRWAY INHALATION TREATMENT: CPT

## 2025-08-16 RX ORDER — FAMOTIDINE 20 MG/1
20 TABLET, FILM COATED ORAL
Status: COMPLETED | OUTPATIENT
Start: 2025-08-16 | End: 2025-08-16

## 2025-08-16 RX ORDER — SODIUM CHLORIDE, SODIUM LACTATE, POTASSIUM CHLORIDE, AND CALCIUM CHLORIDE .6; .31; .03; .02 G/100ML; G/100ML; G/100ML; G/100ML
500 INJECTION, SOLUTION INTRAVENOUS
Status: COMPLETED | OUTPATIENT
Start: 2025-08-16 | End: 2025-08-16

## 2025-08-16 RX ORDER — IPRATROPIUM BROMIDE AND ALBUTEROL SULFATE 2.5; .5 MG/3ML; MG/3ML
1 SOLUTION RESPIRATORY (INHALATION)
Status: COMPLETED | OUTPATIENT
Start: 2025-08-16 | End: 2025-08-16

## 2025-08-16 RX ORDER — ACETAMINOPHEN 325 MG/1
650 TABLET ORAL
Status: COMPLETED | OUTPATIENT
Start: 2025-08-16 | End: 2025-08-16

## 2025-08-16 RX ADMIN — SODIUM CHLORIDE, SODIUM LACTATE, POTASSIUM CHLORIDE, AND CALCIUM CHLORIDE 500 ML: .6; .31; .03; .02 INJECTION, SOLUTION INTRAVENOUS at 13:26

## 2025-08-16 RX ADMIN — IPRATROPIUM BROMIDE AND ALBUTEROL SULFATE 1 DOSE: 2.5; .5 SOLUTION RESPIRATORY (INHALATION) at 13:34

## 2025-08-16 RX ADMIN — ACETAMINOPHEN 650 MG: 325 TABLET ORAL at 13:28

## 2025-08-16 RX ADMIN — FAMOTIDINE 20 MG: 20 TABLET, FILM COATED ORAL at 13:28

## 2025-08-16 ASSESSMENT — PAIN SCALES - GENERAL
PAINLEVEL_OUTOF10: 9
PAINLEVEL_OUTOF10: 8

## 2025-08-16 ASSESSMENT — PAIN DESCRIPTION - LOCATION
LOCATION: PELVIS
LOCATION: PELVIS

## 2025-08-16 ASSESSMENT — PAIN - FUNCTIONAL ASSESSMENT
PAIN_FUNCTIONAL_ASSESSMENT: 0-10
PAIN_FUNCTIONAL_ASSESSMENT: 0-10

## 2025-08-21 ENCOUNTER — ANTI-COAG VISIT (OUTPATIENT)
Age: 65
End: 2025-08-21
Payer: MEDICAID

## 2025-08-21 DIAGNOSIS — Z79.01 LONG TERM (CURRENT) USE OF ANTICOAGULANTS: Primary | ICD-10-CM

## 2025-08-21 DIAGNOSIS — Z95.2 AORTIC VALVE REPLACED: ICD-10-CM

## 2025-08-21 LAB
POC INR: 1.8
PROTHROMBIN TIME, POC: NORMAL

## 2025-08-21 PROCEDURE — 85610 PROTHROMBIN TIME: CPT | Performed by: INTERNAL MEDICINE

## 2025-08-21 PROCEDURE — 93793 ANTICOAG MGMT PT WARFARIN: CPT | Performed by: INTERNAL MEDICINE

## 2025-08-29 ENCOUNTER — ANTI-COAG VISIT (OUTPATIENT)
Age: 65
End: 2025-08-29
Payer: MEDICAID

## 2025-08-29 DIAGNOSIS — Z79.01 LONG TERM (CURRENT) USE OF ANTICOAGULANTS: Primary | ICD-10-CM

## 2025-08-29 DIAGNOSIS — Z95.2 AORTIC VALVE REPLACED: ICD-10-CM

## 2025-08-29 LAB
POC INR: 3.8
PROTHROMBIN TIME, POC: NORMAL

## 2025-08-29 PROCEDURE — 93793 ANTICOAG MGMT PT WARFARIN: CPT | Performed by: INTERNAL MEDICINE

## 2025-08-29 PROCEDURE — 85610 PROTHROMBIN TIME: CPT | Performed by: INTERNAL MEDICINE

## 2025-09-05 ENCOUNTER — ANTI-COAG VISIT (OUTPATIENT)
Age: 65
End: 2025-09-05
Payer: MEDICAID

## 2025-09-05 DIAGNOSIS — Z79.01 LONG TERM (CURRENT) USE OF ANTICOAGULANTS: Primary | ICD-10-CM

## 2025-09-05 DIAGNOSIS — Z95.2 AORTIC VALVE REPLACED: ICD-10-CM

## 2025-09-05 LAB
POC INR: 2.8
PROTHROMBIN TIME, POC: NORMAL

## 2025-09-05 PROCEDURE — 93793 ANTICOAG MGMT PT WARFARIN: CPT | Performed by: INTERNAL MEDICINE

## 2025-09-05 PROCEDURE — 85610 PROTHROMBIN TIME: CPT | Performed by: INTERNAL MEDICINE
